# Patient Record
Sex: FEMALE | Race: WHITE | NOT HISPANIC OR LATINO | Employment: UNEMPLOYED | URBAN - METROPOLITAN AREA
[De-identification: names, ages, dates, MRNs, and addresses within clinical notes are randomized per-mention and may not be internally consistent; named-entity substitution may affect disease eponyms.]

---

## 2021-01-11 ENCOUNTER — TELEPHONE (OUTPATIENT)
Dept: OBSTETRICS AND GYNECOLOGY | Facility: CLINIC | Age: 41
End: 2021-01-11

## 2021-04-13 ENCOUNTER — OFFICE VISIT (OUTPATIENT)
Dept: OBSTETRICS AND GYNECOLOGY | Facility: CLINIC | Age: 41
End: 2021-04-13
Payer: COMMERCIAL

## 2021-04-13 VITALS
WEIGHT: 143 LBS | HEIGHT: 67 IN | DIASTOLIC BLOOD PRESSURE: 84 MMHG | BODY MASS INDEX: 22.44 KG/M2 | HEART RATE: 70 BPM | SYSTOLIC BLOOD PRESSURE: 124 MMHG

## 2021-04-13 DIAGNOSIS — R19.00 PELVIC FULLNESS: ICD-10-CM

## 2021-04-13 DIAGNOSIS — Z01.419 WELL WOMAN EXAM WITH ROUTINE GYNECOLOGICAL EXAM: Primary | ICD-10-CM

## 2021-04-13 DIAGNOSIS — Z12.31 BREAST CANCER SCREENING BY MAMMOGRAM: ICD-10-CM

## 2021-04-13 DIAGNOSIS — Z80.41 FAMILY HISTORY OF OVARIAN CANCER: ICD-10-CM

## 2021-04-13 PROCEDURE — 99396 PREV VISIT EST AGE 40-64: CPT | Mod: S$GLB,,, | Performed by: OBSTETRICS & GYNECOLOGY

## 2021-04-13 PROCEDURE — 3008F PR BODY MASS INDEX (BMI) DOCUMENTED: ICD-10-PCS | Mod: CPTII,S$GLB,, | Performed by: OBSTETRICS & GYNECOLOGY

## 2021-04-13 PROCEDURE — 99396 PR PREVENTIVE VISIT,EST,40-64: ICD-10-PCS | Mod: S$GLB,,, | Performed by: OBSTETRICS & GYNECOLOGY

## 2021-04-13 PROCEDURE — 3008F BODY MASS INDEX DOCD: CPT | Mod: CPTII,S$GLB,, | Performed by: OBSTETRICS & GYNECOLOGY

## 2021-04-13 RX ORDER — ESCITALOPRAM OXALATE 10 MG/1
TABLET ORAL
COMMUNITY
Start: 2021-04-01

## 2021-04-13 RX ORDER — CLONAZEPAM 0.5 MG/1
TABLET ORAL
COMMUNITY
Start: 2021-04-01

## 2022-04-19 ENCOUNTER — OFFICE VISIT (OUTPATIENT)
Dept: OBSTETRICS AND GYNECOLOGY | Facility: CLINIC | Age: 42
End: 2022-04-19
Payer: COMMERCIAL

## 2022-04-19 VITALS
HEART RATE: 61 BPM | DIASTOLIC BLOOD PRESSURE: 72 MMHG | SYSTOLIC BLOOD PRESSURE: 116 MMHG | WEIGHT: 157 LBS | BODY MASS INDEX: 24.59 KG/M2

## 2022-04-19 DIAGNOSIS — Z01.419 WELL WOMAN EXAM WITH ROUTINE GYNECOLOGICAL EXAM: Primary | ICD-10-CM

## 2022-04-19 DIAGNOSIS — Z80.41 FAMILY HISTORY OF OVARIAN CANCER: ICD-10-CM

## 2022-04-19 DIAGNOSIS — M54.50 BACK PAIN AT L4-L5 LEVEL: ICD-10-CM

## 2022-04-19 PROBLEM — M54.40 CHRONIC LOW BACK PAIN WITH SCIATICA: Status: ACTIVE | Noted: 2022-04-19

## 2022-04-19 PROBLEM — G89.29 CHRONIC LOW BACK PAIN WITH SCIATICA: Status: ACTIVE | Noted: 2022-04-19

## 2022-04-19 LAB
ABS NRBC COUNT: 0 X 10 3/UL (ref 0–0.01)
ABSOLUTE BASOPHIL: 0.03 X 10 3/UL (ref 0–0.22)
ABSOLUTE EOSINOPHIL: 0.05 X 10 3/UL (ref 0.04–0.54)
ABSOLUTE IMMATURE GRAN: 0.03 X 10 3/UL (ref 0–0.04)
ABSOLUTE LYMPHOCYTE: 1.45 X 10 3/UL (ref 0.86–4.75)
ABSOLUTE MONOCYTE: 0.52 X 10 3/UL (ref 0.22–1.08)
ALBUMIN SERPL-MCNC: 4.5 G/DL (ref 3.5–5.2)
ALBUMIN/GLOB SERPL ELPH: 2.5 {RATIO} (ref 1–2.7)
ALP ISOS SERPL LEV INH-CCNC: 69 U/L (ref 35–105)
ALT (SGPT): 19 U/L (ref 0–33)
AMORPH URATE CRY URNS QL MICRO: NEGATIVE
ANION GAP SERPL CALC-SCNC: 10 MMOL/L (ref 8–17)
AST SERPL-CCNC: 23 U/L (ref 0–32)
BACTERIA #/AREA URNS HPF: ABNORMAL /[HPF]
BASOPHILS NFR BLD: 0.6 % (ref 0.2–1.2)
BILIRUB UR QL STRIP: NEGATIVE
BILIRUBIN, TOTAL: 0.31 MG/DL (ref 0–1.2)
BUN/CREAT SERPL: 23.2 (ref 6–20)
CALCIUM SERPL-MCNC: 9.1 MG/DL (ref 8.6–10.2)
CANCER AG125 SERPL-ACNC: 12 U/ML (ref 0–38.1)
CARBON DIOXIDE, CO2: 27 MMOL/L (ref 22–29)
CHLORIDE: 107 MMOL/L (ref 98–107)
CLARITY UR: CLEAR
COLOR UR: YELLOW
CREAT SERPL-MCNC: 0.75 MG/DL (ref 0.5–0.9)
E2: 243 PG/ML
EOSINOPHIL NFR BLD: 0.9 % (ref 0.7–7)
EPITHELIAL CELLS: ABNORMAL
FSH: 3.95 MIU/ML
GFR ESTIMATION: 83.95
GLOBULIN: 1.8 G/DL (ref 1.5–4.5)
GLUCOSE (UA): NEGATIVE MG/DL
GLUCOSE: 88 MG/DL (ref 74–106)
HCT VFR BLD AUTO: 37.2 % (ref 37–47)
HGB BLD-MCNC: 12.2 G/DL (ref 12–16)
IMMATURE GRANULOCYTES: 0.6 % (ref 0–0.5)
KETONES UR QL STRIP: NEGATIVE MG/DL
LEUKOCYTE ESTERASE UR QL STRIP: NEGATIVE
LH: 6 MIU/ML
LYMPHOCYTES NFR BLD: 27 % (ref 19.3–53.1)
MCH RBC QN AUTO: 31.4 PG (ref 27–32)
MCHC RBC AUTO-ENTMCNC: 32.8 G/DL (ref 32–36)
MCV RBC AUTO: 95.6 FL (ref 82–100)
MONOCYTES NFR BLD: 9.7 % (ref 4.7–12.5)
MUCOUS THREADS URNS QL MICRO: NEGATIVE
NEUTROPHILS # BLD AUTO: 3.3 X 10 3/UL (ref 2.15–7.56)
NEUTROPHILS NFR BLD: 61.2 % (ref 34–71.1)
NITRITE UR QL STRIP: NEGATIVE
NUCLEATED RED BLOOD CELLS: 0 /100 WBC (ref 0–0.2)
OCCULT BLOOD: NEGATIVE
PH, URINE: 5 (ref 5–7.5)
PLATELET # BLD AUTO: 167 X 10 3/UL (ref 135–400)
POTASSIUM: 5.4 MMOL/L (ref 3.5–5.1)
PROT SNV-MCNC: 6.3 G/DL (ref 6.4–8.3)
PROT UR QL STRIP: NEGATIVE MG/DL
RBC # BLD AUTO: 3.89 X 10 6/UL (ref 4.2–5.4)
RBC/HPF: NEGATIVE
RDW-SD: 46.5 FL (ref 37–54)
SODIUM: 144 MMOL/L (ref 136–145)
SP GR UR STRIP: 1.01 (ref 1–1.03)
T4, FREE: 1.03 NG/DL (ref 0.93–1.7)
TSH SERPL DL<=0.005 MIU/L-ACNC: 2.34 UIU/ML (ref 0.27–4.2)
UREA NITROGEN (BUN): 17.4 MG/DL (ref 6–20)
UROBILINOGEN, URINE: NORMAL E.U./DL (ref 0–1)
VITAMIN D (25OHD): 35.2 NG/ML
WBC # BLD: 5.38 X 10 3/UL (ref 4.3–10.8)
WBC/HPF: ABNORMAL

## 2022-04-19 PROCEDURE — 1159F MED LIST DOCD IN RCRD: CPT | Mod: CPTII,S$GLB,, | Performed by: OBSTETRICS & GYNECOLOGY

## 2022-04-19 PROCEDURE — 3074F PR MOST RECENT SYSTOLIC BLOOD PRESSURE < 130 MM HG: ICD-10-PCS | Mod: CPTII,S$GLB,, | Performed by: OBSTETRICS & GYNECOLOGY

## 2022-04-19 PROCEDURE — 3074F SYST BP LT 130 MM HG: CPT | Mod: CPTII,S$GLB,, | Performed by: OBSTETRICS & GYNECOLOGY

## 2022-04-19 PROCEDURE — 99396 PR PREVENTIVE VISIT,EST,40-64: ICD-10-PCS | Mod: S$GLB,,, | Performed by: OBSTETRICS & GYNECOLOGY

## 2022-04-19 PROCEDURE — 3008F PR BODY MASS INDEX (BMI) DOCUMENTED: ICD-10-PCS | Mod: CPTII,S$GLB,, | Performed by: OBSTETRICS & GYNECOLOGY

## 2022-04-19 PROCEDURE — 1159F PR MEDICATION LIST DOCUMENTED IN MEDICAL RECORD: ICD-10-PCS | Mod: CPTII,S$GLB,, | Performed by: OBSTETRICS & GYNECOLOGY

## 2022-04-19 PROCEDURE — 3008F BODY MASS INDEX DOCD: CPT | Mod: CPTII,S$GLB,, | Performed by: OBSTETRICS & GYNECOLOGY

## 2022-04-19 PROCEDURE — 99396 PREV VISIT EST AGE 40-64: CPT | Mod: S$GLB,,, | Performed by: OBSTETRICS & GYNECOLOGY

## 2022-04-19 PROCEDURE — 3078F PR MOST RECENT DIASTOLIC BLOOD PRESSURE < 80 MM HG: ICD-10-PCS | Mod: CPTII,S$GLB,, | Performed by: OBSTETRICS & GYNECOLOGY

## 2022-04-19 PROCEDURE — 3078F DIAST BP <80 MM HG: CPT | Mod: CPTII,S$GLB,, | Performed by: OBSTETRICS & GYNECOLOGY

## 2022-04-19 NOTE — PROGRESS NOTES
Subjective:       Patient ID: Alise Dleeon is a 42 y.o. female.    Chief Complaint:  Well Woman      History of Present Illness  She is doing well.  No new health issues,  No abnormal bleeding, No GI or Gu concerns,  No dyspareunia.   She is having sig back pain  She has seen pain management   drs.   She does have some night sweats.   She is taking clonopin to help her sleep.  She is on lexapro   She has been on this a year.     HPI      GYN & OB History  Patient's last menstrual period was 04/10/2022.     Date of Last Pap: No result found    OB History    Para Term  AB Living   2 2       2   SAB IAB Ectopic Multiple Live Births                  # Outcome Date GA Lbr Brendan/2nd Weight Sex Delivery Anes PTL Lv   2 Para            1 Para                Review of Systems  Review of Systems   Constitutional: Negative for activity change.   Eyes: Negative for visual disturbance.   Respiratory: Negative for shortness of breath.    Cardiovascular: Negative for chest pain.   Gastrointestinal: Negative for abdominal pain.   Genitourinary: Negative for vaginal bleeding.        No abnormal vaginal bleeding   Musculoskeletal: Negative for back pain.   Integumentary:  Negative for rash and breast mass.   Neurological: Negative for numbness.   Psychiatric/Behavioral:        No mood disturbance or changes    Breast: Negative for mass            Objective:    Physical Exam:   Constitutional: She is oriented to person, place, and time. She appears well-developed. She is cooperative.    HENT:   Head: Normocephalic.     Neck: Trachea normal. No thyromegaly present.    Cardiovascular: Normal rate, regular rhythm and normal heart sounds.     Pulmonary/Chest: Effort normal and breath sounds normal. Right breast exhibits no mass, no nipple discharge and no skin change. Left breast exhibits no mass, no nipple discharge and no skin change.        Abdominal: Soft. There is no abdominal tenderness. There is no rebound and no  guarding.     Genitourinary:    Vagina and uterus normal.      Pelvic exam was performed with patient supine.   Labial bartholins normal.There is no lesion on the right labia. There is no lesion on the left labia. Cervix is normal. Right adnexum displays no mass and no tenderness. Left adnexum displays no mass and no tenderness. Cervix exhibits no discharge. Also,  pap results normal   Uterus is not enlarged and not tender.              Lymphadenopathy:        Head (right side): No submental and no submandibular adenopathy present.        Head (left side): No submental and no submandibular adenopathy present.     She has no cervical adenopathy.    Neurological: She is alert and oriented to person, place, and time.    Skin: Skin is warm.    Psychiatric: She has a normal mood and affect. Her speech is normal and behavior is normal. Thought content normal.          Assessment:        1. Well woman exam with routine gynecological exam    2. Back pain at L4-L5 level    3. Family history of ovarian cancer               Plan:         will see lesley feng     Pap   Mammogram  Follow up one year or sooner if needed  Self breast exams  Consider health profile if labs not done with PCP  Recommend colonoscopy   Pt is aware we call all results. If she does not hear from our office regarding her result within a week of having a study or procedure performed she is to call the office so that we can research the result for her as I do not know when she is scheduling her procedures.   Chaperone was present

## 2022-04-22 DIAGNOSIS — R79.89 ABNORMAL CBC: ICD-10-CM

## 2022-04-22 DIAGNOSIS — Z01.419 WELL WOMAN EXAM WITH ROUTINE GYNECOLOGICAL EXAM: Primary | ICD-10-CM

## 2022-04-27 ENCOUNTER — PROCEDURE VISIT (OUTPATIENT)
Dept: OBSTETRICS AND GYNECOLOGY | Facility: CLINIC | Age: 42
End: 2022-04-27
Payer: COMMERCIAL

## 2022-04-27 DIAGNOSIS — M54.50 BACK PAIN AT L4-L5 LEVEL: ICD-10-CM

## 2022-04-27 DIAGNOSIS — Z80.41 FAMILY HISTORY OF OVARIAN CANCER: ICD-10-CM

## 2022-04-27 DIAGNOSIS — Z01.419 WELL WOMAN EXAM WITH ROUTINE GYNECOLOGICAL EXAM: ICD-10-CM

## 2022-04-27 DIAGNOSIS — N83.201 CYST OF RIGHT OVARY: ICD-10-CM

## 2022-04-27 DIAGNOSIS — Z80.41 FAMILY HISTORY OF OVARIAN CANCER: Primary | ICD-10-CM

## 2022-04-27 PROCEDURE — 76830 US OB/GYN PROCEDURE (VIEWPOINT): ICD-10-PCS | Mod: S$GLB,,, | Performed by: OBSTETRICS & GYNECOLOGY

## 2022-04-27 PROCEDURE — 76830 TRANSVAGINAL US NON-OB: CPT | Mod: S$GLB,,, | Performed by: OBSTETRICS & GYNECOLOGY

## 2022-05-09 ENCOUNTER — TELEPHONE (OUTPATIENT)
Dept: OBSTETRICS AND GYNECOLOGY | Facility: CLINIC | Age: 42
End: 2022-05-09
Payer: COMMERCIAL

## 2022-05-09 NOTE — TELEPHONE ENCOUNTER
----- Message from Nabila Shen sent at 5/9/2022 10:19 AM CDT -----   Patient calling to reschedule ultrasound . Call back number [phone] (home)

## 2022-05-09 NOTE — TELEPHONE ENCOUNTER
Patient states she will be out of town and needs to reschedule her appointment. Patient has been rescheduled.

## 2022-06-01 ENCOUNTER — PROCEDURE VISIT (OUTPATIENT)
Dept: OBSTETRICS AND GYNECOLOGY | Facility: CLINIC | Age: 42
End: 2022-06-01
Payer: COMMERCIAL

## 2022-06-01 DIAGNOSIS — Z80.41 FAMILY HISTORY OF OVARIAN CANCER: ICD-10-CM

## 2022-06-01 DIAGNOSIS — N83.201 CYST OF RIGHT OVARY: ICD-10-CM

## 2022-06-01 PROCEDURE — 76830 US OB/GYN PROCEDURE (VIEWPOINT): ICD-10-PCS | Mod: S$GLB,,, | Performed by: OBSTETRICS & GYNECOLOGY

## 2022-06-01 PROCEDURE — 76830 TRANSVAGINAL US NON-OB: CPT | Mod: S$GLB,,, | Performed by: OBSTETRICS & GYNECOLOGY

## 2022-06-01 NOTE — Clinical Note
Please call the cyst resolved on the rt ovary the left has a small 1.7 cm simple cyst   everything is ok

## 2022-06-15 ENCOUNTER — PATIENT MESSAGE (OUTPATIENT)
Dept: OBSTETRICS AND GYNECOLOGY | Facility: CLINIC | Age: 42
End: 2022-06-15
Payer: COMMERCIAL

## 2022-06-15 NOTE — PROGRESS NOTES
6/16/22  3:53pm attempted to contact patient, no answer, left voicemail. Sent message via patient portal. Will attempt to reach patient again at a later time.

## 2022-07-07 ENCOUNTER — PATIENT MESSAGE (OUTPATIENT)
Dept: OBSTETRICS AND GYNECOLOGY | Facility: CLINIC | Age: 42
End: 2022-07-07
Payer: COMMERCIAL

## 2022-10-13 DIAGNOSIS — R79.89 LOW TESTOSTERONE: Primary | ICD-10-CM

## 2022-10-13 NOTE — TELEPHONE ENCOUNTER
----- Message from Toma Saavedra sent at 10/13/2022  2:58 PM CDT -----  Contact: self  Pt wanted to talk to the nurse about her hormone pills please call back at [phone]

## 2022-10-13 NOTE — TELEPHONE ENCOUNTER
Patient had labs done with her internist and her testosterone came back low. She c/o fatige, joint pain, weight gain in abdomin. She is interested in starting on something to increase her testosterone and wants to know what Dr. Dawson recommends. Her mom  from cervical cancer, so she would like to go with a safe option with her family history. Advised pt that Dr. Dawson is out of the office but we will check with him and contact her next week.

## 2022-10-20 RX ORDER — TESTOSTERONE
POWDER (GRAM) MISCELLANEOUS
Qty: 30 G | Refills: 4 | Status: SHIPPED | OUTPATIENT
Start: 2022-10-20

## 2022-10-20 NOTE — TELEPHONE ENCOUNTER
Dr. Dawson recommended testosterone cream from [facility]. Called and spoke to patient about prescription. Patient would like to recheck testosterone at a later date to monitor. Sent order to path lab.

## 2023-02-18 PROBLEM — Z80.3 FAMILY HISTORY OF MALIGNANT NEOPLASM OF BREAST: Status: ACTIVE | Noted: 2023-02-18

## 2023-02-18 PROBLEM — Z80.41 FAMILY HISTORY OF MALIGNANT NEOPLASM OF OVARY: Status: ACTIVE | Noted: 2023-02-18

## 2023-02-18 PROBLEM — Z87.2 HISTORY OF ABSCESS OF BREAST: Status: ACTIVE | Noted: 2023-02-18

## 2023-02-18 NOTE — PROGRESS NOTES
Breast Surgery Clinic Note    Chief Complaint:   Alise Deleon is a 43 y.o. female presenting today as part of our high-risk clinic. It is time for her annual mammogram. She will reports no worries findings on her self examination.    History of Present Illness:   Mrs. Deleon first presented on November 14, 2016 to establish ongoing breast care. She has a worrisome family history. Her HILLARY life time risk of breast cancer is 19.5% (2018). MyRisk Negative (2018). MD::: Blayne Purvis MD; Shan Dawson MD.    Past Medical History:   Diagnosis Date    Acne     At high risk for breast cancer     Excessive menstruation     Family history of malignant neoplasm of breast 2/18/2023    Family history of malignant neoplasm of ovary 2/18/2023    Family history of ovarian cancer     Fissure, anal     Hair loss     History of chickenpox     Ovary palpable       Past Surgical History:   Procedure Laterality Date    BREAST CYST ASPIRATION Left     LAPAROSCOPY WITH LYSIS OF ADHESIONS      LEFT CYSTECTOMY      TONSILLECTOMY      WISDOM TOOTH EXTRACTION          Current Outpatient Medications:     clonazePAM (KLONOPIN) 0.5 MG tablet, TAKE 1/2 1 TABLET BY MOUTH TWICE DAILY AND 1 2 TABLETS AT BEDTIME, Disp: , Rfl:     EScitalopram oxalate (LEXAPRO) 10 MG tablet, TAKE 1 TABLET BY MOUTH EVERY DAY AT BEDTIME AS DIRECTED, Disp: , Rfl:     ibuprofen/famotidine (DUEXIS ORAL), Take by mouth., Disp: , Rfl:     testosterone, bulk, Powd, COMPOUNDED TESTOSTERONE CREAM 3MG/ML APPLY 1ML TOPICALLY DAILY AS DIRECTED #30GM WITH 6RF, Disp: 30 g, Rfl: 4   Review of patient's allergies indicates:  No Known Allergies   Social History     Tobacco Use    Smoking status: Never    Smokeless tobacco: Never   Substance Use Topics    Alcohol use: Yes     Comment: OCCASIONAL       Family History   Problem Relation Age of Onset    Hypertension Father     Heart disease Father     Hypertension Mother     No Known Problems Sister     No Known Problems Brother      Breast cancer Maternal Grandmother     No Known Problems Maternal Grandfather     No Known Problems Paternal Grandmother     No Known Problems Paternal Grandfather     Breast cancer Other         aunt      Review of Systems   Genitourinary:  Negative for hot flashes.   Integumentary:  Negative for color change, rash, mole/lesion, breast mass, breast discharge and breast tenderness.   Breast: Negative for mass and tenderness   Physical Exam  Chest:      Chest wall: No mass.   Breasts:     Right: Normal. No swelling, mass, nipple discharge or skin change.      Left: Normal. No swelling, mass, nipple discharge or skin change.   Lymphadenopathy:      Upper Body:      Right upper body: No axillary adenopathy.      Left upper body: No axillary adenopathy.     MAMMOGRAM REPORT: She has some diffuse fibronodular tissue bilaterally; there are no spiculated lesions, distortions or suspicious calcifications noted; NEM       1. Family history of malignant neoplasm of breast    We discussed her family history and how it could impact her own future risks.  We discussed family vs. genetic history and the importance of each.  Genetic Counseling/Testing was offered, and all questions answered.     2. Family history of malignant neoplasm of ovary    She tested Gene Negative in 2018.  We discussed all implications.    3. History of abscess of  left breast    She has had no further abscess issues.    4. Screening breast examination     She understands that her imaging and exams have remained stable and is comfortable being followed in a conservative fashion. She understands the importance of monthly self-breast examination and knows to report any and all changes as they occur.     Her films are stable based on my review.  As this was done as a screening exam, her films will be reviewed by the Radiologist and compared to her previous films.  Her report will usually be received within 24 hours.  Once received and reviewed - I will phone  her with any additional recommendations as needed.      Medical Decision Making:    Follow up:  6 months and prn (PE, MRI vs US)

## 2023-03-01 ENCOUNTER — OFFICE VISIT (OUTPATIENT)
Dept: SURGERY | Facility: CLINIC | Age: 43
End: 2023-03-01
Payer: COMMERCIAL

## 2023-03-01 DIAGNOSIS — Z12.39 SCREENING BREAST EXAMINATION: ICD-10-CM

## 2023-03-01 DIAGNOSIS — Z80.41 FAMILY HISTORY OF MALIGNANT NEOPLASM OF OVARY: ICD-10-CM

## 2023-03-01 DIAGNOSIS — Z87.2 HISTORY OF ABSCESS OF BREAST: ICD-10-CM

## 2023-03-01 DIAGNOSIS — Z80.3 FAMILY HISTORY OF MALIGNANT NEOPLASM OF BREAST: Primary | ICD-10-CM

## 2023-03-01 PROCEDURE — 1159F MED LIST DOCD IN RCRD: CPT | Mod: CPTII,S$GLB,, | Performed by: SPECIALIST

## 2023-03-01 PROCEDURE — 99213 PR OFFICE/OUTPT VISIT, EST, LEVL III, 20-29 MIN: ICD-10-PCS | Mod: S$GLB,,, | Performed by: SPECIALIST

## 2023-03-01 PROCEDURE — 99213 OFFICE O/P EST LOW 20 MIN: CPT | Mod: S$GLB,,, | Performed by: SPECIALIST

## 2023-03-01 PROCEDURE — 1159F PR MEDICATION LIST DOCUMENTED IN MEDICAL RECORD: ICD-10-PCS | Mod: CPTII,S$GLB,, | Performed by: SPECIALIST

## 2023-03-01 PROCEDURE — 1160F RVW MEDS BY RX/DR IN RCRD: CPT | Mod: CPTII,S$GLB,, | Performed by: SPECIALIST

## 2023-03-01 PROCEDURE — 1160F PR REVIEW ALL MEDS BY PRESCRIBER/CLIN PHARMACIST DOCUMENTED: ICD-10-PCS | Mod: CPTII,S$GLB,, | Performed by: SPECIALIST

## 2023-04-20 ENCOUNTER — OFFICE VISIT (OUTPATIENT)
Dept: OBSTETRICS AND GYNECOLOGY | Facility: CLINIC | Age: 43
End: 2023-04-20
Payer: COMMERCIAL

## 2023-04-20 VITALS
BODY MASS INDEX: 25.11 KG/M2 | HEART RATE: 76 BPM | HEIGHT: 67 IN | WEIGHT: 160 LBS | DIASTOLIC BLOOD PRESSURE: 70 MMHG | SYSTOLIC BLOOD PRESSURE: 104 MMHG

## 2023-04-20 DIAGNOSIS — Z01.419 WELL WOMAN EXAM WITH ROUTINE GYNECOLOGICAL EXAM: Primary | ICD-10-CM

## 2023-04-20 DIAGNOSIS — Z12.31 BREAST CANCER SCREENING BY MAMMOGRAM: ICD-10-CM

## 2023-04-20 PROCEDURE — 3074F SYST BP LT 130 MM HG: CPT | Mod: CPTII,S$GLB,, | Performed by: OBSTETRICS & GYNECOLOGY

## 2023-04-20 PROCEDURE — 3074F PR MOST RECENT SYSTOLIC BLOOD PRESSURE < 130 MM HG: ICD-10-PCS | Mod: CPTII,S$GLB,, | Performed by: OBSTETRICS & GYNECOLOGY

## 2023-04-20 PROCEDURE — 3078F PR MOST RECENT DIASTOLIC BLOOD PRESSURE < 80 MM HG: ICD-10-PCS | Mod: CPTII,S$GLB,, | Performed by: OBSTETRICS & GYNECOLOGY

## 2023-04-20 PROCEDURE — 3008F PR BODY MASS INDEX (BMI) DOCUMENTED: ICD-10-PCS | Mod: CPTII,S$GLB,, | Performed by: OBSTETRICS & GYNECOLOGY

## 2023-04-20 PROCEDURE — 3078F DIAST BP <80 MM HG: CPT | Mod: CPTII,S$GLB,, | Performed by: OBSTETRICS & GYNECOLOGY

## 2023-04-20 PROCEDURE — 99396 PR PREVENTIVE VISIT,EST,40-64: ICD-10-PCS | Mod: S$GLB,,, | Performed by: OBSTETRICS & GYNECOLOGY

## 2023-04-20 PROCEDURE — 3008F BODY MASS INDEX DOCD: CPT | Mod: CPTII,S$GLB,, | Performed by: OBSTETRICS & GYNECOLOGY

## 2023-04-20 PROCEDURE — 99396 PREV VISIT EST AGE 40-64: CPT | Mod: S$GLB,,, | Performed by: OBSTETRICS & GYNECOLOGY

## 2023-04-20 NOTE — PROGRESS NOTES
Subjective:       Patient ID: Alise Deleon is a 43 y.o. female.    Chief Complaint:  Well Woman      History of Present Illness  She is doing well.  No new health issues,  No abnormal bleeding, No GI or Gu concerns,  No dyspareunia.   Bilateral salpingectomy   her cycles are 27 days  but last one-two days    she is on a testosterone support     HPI      GYN & OB History  Patient's last menstrual period was 2023 (exact date).     Date of Last Pap: No result found    OB History    Para Term  AB Living   2 2       2   SAB IAB Ectopic Multiple Live Births                  # Outcome Date GA Lbr Brendan/2nd Weight Sex Delivery Anes PTL Lv   2 Para            1 Para                Review of Systems  Review of Systems   Constitutional:  Negative for activity change.   Eyes:  Negative for visual disturbance.   Respiratory:  Negative for shortness of breath.    Cardiovascular:  Negative for chest pain.   Gastrointestinal:  Negative for abdominal pain.   Genitourinary:  Negative for vaginal bleeding.        No abnormal vaginal bleeding   Musculoskeletal:  Negative for back pain.   Integumentary:  Negative for rash and breast mass.   Neurological:  Negative for numbness.   Psychiatric/Behavioral:          No mood disturbance or changes    Breast: Negative for mass          Objective:    Physical Exam:   Constitutional: She is oriented to person, place, and time. She appears well-developed. She is cooperative.    HENT:   Head: Normocephalic.     Neck: Trachea normal. No thyromegaly present.    Cardiovascular:  Normal rate, regular rhythm and normal heart sounds.             Pulmonary/Chest: Effort normal and breath sounds normal. Right breast exhibits no mass, no nipple discharge and no skin change. Left breast exhibits no mass, no nipple discharge and no skin change.        Abdominal: Soft. There is no abdominal tenderness. There is no rebound and no guarding.     Genitourinary:    Vagina and uterus normal.       Pelvic exam was performed with patient supine.   Labial bartholins normal.There is no lesion on the right labia. There is no lesion on the left labia. Cervix is normal. Right adnexum displays no mass and no tenderness. Left adnexum displays no mass and no tenderness. Cervix exhibits no discharge. Uterus is not enlarged and not tender.              Lymphadenopathy:        Head (right side): No submental and no submandibular adenopathy present.        Head (left side): No submental and no submandibular adenopathy present.     She has no cervical adenopathy.    Neurological: She is alert and oriented to person, place, and time.    Skin: Skin is warm.    Psychiatric: She has a normal mood and affect. Her speech is normal and behavior is normal. Thought content normal.        Assessment:        1. Well woman exam with routine gynecological exam    2. Breast cancer screening by mammogram               Plan:           Will try a lo loestin  to help with her perimenopausal symptoms  Pap   Mammogram  Follow up one year or sooner if needed  Self breast exams  Consider health profile if labs not done with PCP  Recommend colonoscopy   Pt is aware we call all results. If she does not hear from our office regarding her result within a week of having a study or procedure performed she is to call the office so that we can research the result for her as I do not know when she is scheduling her procedures.   Chaperone was present

## 2023-04-24 LAB — Lab: NORMAL

## 2023-07-03 ENCOUNTER — PATIENT MESSAGE (OUTPATIENT)
Dept: OBSTETRICS AND GYNECOLOGY | Facility: CLINIC | Age: 43
End: 2023-07-03
Payer: COMMERCIAL

## 2023-07-03 DIAGNOSIS — Z80.41 FAMILY HISTORY OF OVARIAN CANCER: Primary | ICD-10-CM

## 2023-07-28 ENCOUNTER — PATIENT MESSAGE (OUTPATIENT)
Dept: OBSTETRICS AND GYNECOLOGY | Facility: CLINIC | Age: 43
End: 2023-07-28
Payer: COMMERCIAL

## 2023-08-22 NOTE — ASSESSMENT & PLAN NOTE
We discussed her family history and how it could impact her own future risks.  We discussed family vs. genetic history and the importance and implications of each.  Genetic Counseling/Testing was offered, and all questions answered to her satisfaction.  She knows that as additional family members are diagnosed - she will need to let us know as this may change follow up and imaging recommendations.    We had a discussion concerning Breast Cancer Risk Reduction and current NCCN Guidelines. She knows that her risk can be lowered slightly with a healthy lifestyle and minimal ETOH use. Being physically active will also help. She should reduce or stay away from OCPs and HRT as possible.     We reviewed our findings today and her questions were answered.  She understands that her imaging and exams have remained stable (and show nothing concerning).  She is comfortable being followed in a conservative fashion.      She understands the importance of monthly self-breast examination and knows to report any and all changes as they occur.

## 2023-08-22 NOTE — PROGRESS NOTES
[facility]  Shadi Johnston MD, FACS  Sidra Love NP-MAT      Date of Service: 09/05/2023    Chief Complaint:   Alise Deleon is a 43 y.o. female presenting today for her 6-month evaluation. She is due for an ultrasound.  She reports no interval changes.     History of Present Illness:   Mrs. Deleon first presented on November 14, 2016 to establish ongoing breast care. She has a worrisome family history. Her HILLARY life time risk of breast cancer is 19.5% (2018). MyRisk Negative (2018). MD::: Blanye Purvis MD; Shan Dawson MD    Past Medical History:   Diagnosis Date    Acne     At high risk for breast cancer     Excessive menstruation     Family history of malignant neoplasm of breast 2/18/2023    Family history of malignant neoplasm of ovary 2/18/2023    Family history of ovarian cancer     Fissure, anal     Hair loss     History of chickenpox     Ovary palpable       Past Surgical History:   Procedure Laterality Date    BREAST CYST ASPIRATION Left     LAPAROSCOPY WITH LYSIS OF ADHESIONS      LEFT CYSTECTOMY      TONSILLECTOMY      WISDOM TOOTH EXTRACTION          Current Outpatient Medications:     clonazePAM (KLONOPIN) 0.5 MG tablet, TAKE 1/2 1 TABLET BY MOUTH TWICE DAILY AND 1 2 TABLETS AT BEDTIME, Disp: , Rfl:     EScitalopram oxalate (LEXAPRO) 10 MG tablet, TAKE 1 TABLET BY MOUTH EVERY DAY AT BEDTIME AS DIRECTED, Disp: , Rfl:     ibuprofen/famotidine (DUEXIS ORAL), Take by mouth., Disp: , Rfl:     testosterone, bulk, Powd, COMPOUNDED TESTOSTERONE CREAM 3MG/ML APPLY 1ML TOPICALLY DAILY AS DIRECTED #30GM WITH 6RF, Disp: 30 g, Rfl: 4   Review of patient's allergies indicates:  No Known Allergies   Social History     Tobacco Use    Smoking status: Never    Smokeless tobacco: Never   Substance Use Topics    Alcohol use: Yes     Alcohol/week: 1.0 standard drink of alcohol     Types: 1 Glasses of wine per week     Comment: OCCASIONAL       Family History   Problem  Relation Age of Onset    Hypertension Father     Heart disease Father     Hypertension Mother     Ovarian cancer Mother     No Known Problems Sister     No Known Problems Brother     Breast cancer Maternal Grandmother     No Known Problems Maternal Grandfather     No Known Problems Paternal Grandmother     No Known Problems Paternal Grandfather     Breast cancer Other         aunt        Review of Systems   Integumentary:  Negative for color change, rash, mole/lesion, breast mass, breast discharge and breast tenderness.   Breast: Negative for mass and tenderness       Physical Exam   HENT:   Head: Normocephalic.   Pulmonary/Chest: Right breast exhibits no inverted nipple, no mass, no nipple discharge, no skin change and no tenderness. Left breast exhibits no inverted nipple, no mass, no nipple discharge, no skin change and no tenderness. No breast swelling.   Genitourinary: No breast swelling.   Musculoskeletal: Lymphadenopathy:      Upper Body:      Right upper body: No supraclavicular or axillary adenopathy.      Left upper body: No supraclavicular or axillary adenopathy.     Neurological: She is alert.        ULTRASOUND EVALUATION and REPORT    Bilateral real-time Ultrasound was performed by me.  All four quadrants of the breast, the subareolar and axillary basins were scanned.    She has some heterogeneous dense fibroglandular tissue bilaterally.    Right Breast: She has normal tissues in the right breast; there's no disruption of the tissue planes and no abnormal shadowing; she has normal skin thickness with no subcutaneous nodules of skin thickening; NEM     Left Breast: She has normal tissues in the left breast; there's no disruption of the tissue planes and no abnormal shadowing; she has normal skin thickness with no subcutaneous nodules or skin thickening; NEM     Axillae: There are no abnormal lymph nodes seen bilaterally.     Impression: Some dense but normal tissue bilaterally with no solid/suspicious  masses noted. No LAD in bilateral axillae.  BIRADS: Category 2 - Benign Finding.    Findings were discussed with patient in real time and a hand written report was given to her at the conclusion of the exam.      ASSESSMENT and PLAN OF CARE     1. Family history of malignant neoplasm of breast  Assessment & Plan:  We discussed her family history and how it could impact her own future risks.  We discussed family vs. genetic history and the importance and implications of each.  Genetic Counseling/Testing was offered, and all questions answered to her satisfaction.  She knows that as additional family members are diagnosed - she will need to let us know as this may change follow up and imaging recommendations.    We had a discussion concerning Breast Cancer Risk Reduction and current NCCN Guidelines. She knows that her risk can be lowered slightly with a healthy lifestyle and minimal ETOH use. Being physically active will also help. She should reduce or stay away from OCPs and HRT as possible.     We reviewed our findings today and her questions were answered.  She understands that her imaging and exams have remained stable (and show nothing concerning).  She is comfortable being followed in a conservative fashion.      She understands the importance of monthly self-breast examination and knows to report any and all changes as they occur.        2. Family history of malignant neoplasm of ovary  Assessment & Plan:  Same as above.      Medical Decision Making: It is my impression that the patient suffers from all the listed conditions.  Each of these conditions did affect my Plan of Care and all medical decisions that were rendered.  The medical decision making was of high difficulty based on her co-morbidities and her previous diagnosis of being a High-Risk Patient given her personal and family histories.   I have performed and reviewed all imaging and it has been discussed with her. I have reviewed and verified her  allergies, list of medications, medical and surgical histories, social history, and a pertinent review of symptoms.    Follow up: 6 months and prn     For:  Physical Examination and MGM (S) at

## 2023-09-05 ENCOUNTER — OFFICE VISIT (OUTPATIENT)
Dept: SURGERY | Facility: CLINIC | Age: 43
End: 2023-09-05
Payer: COMMERCIAL

## 2023-09-05 DIAGNOSIS — Z80.3 FAMILY HISTORY OF MALIGNANT NEOPLASM OF BREAST: Primary | ICD-10-CM

## 2023-09-05 DIAGNOSIS — Z80.41 FAMILY HISTORY OF MALIGNANT NEOPLASM OF OVARY: ICD-10-CM

## 2023-09-05 PROCEDURE — 99214 OFFICE O/P EST MOD 30 MIN: CPT | Mod: S$GLB,,, | Performed by: SPECIALIST

## 2023-09-05 PROCEDURE — 1159F PR MEDICATION LIST DOCUMENTED IN MEDICAL RECORD: ICD-10-PCS | Mod: CPTII,S$GLB,, | Performed by: SPECIALIST

## 2023-09-05 PROCEDURE — 1159F MED LIST DOCD IN RCRD: CPT | Mod: CPTII,S$GLB,, | Performed by: SPECIALIST

## 2023-09-05 PROCEDURE — 1160F RVW MEDS BY RX/DR IN RCRD: CPT | Mod: CPTII,S$GLB,, | Performed by: SPECIALIST

## 2023-09-05 PROCEDURE — 99214 PR OFFICE/OUTPT VISIT, EST, LEVL IV, 30-39 MIN: ICD-10-PCS | Mod: S$GLB,,, | Performed by: SPECIALIST

## 2023-09-05 PROCEDURE — 1160F PR REVIEW ALL MEDS BY PRESCRIBER/CLIN PHARMACIST DOCUMENTED: ICD-10-PCS | Mod: CPTII,S$GLB,, | Performed by: SPECIALIST

## 2023-09-18 ENCOUNTER — TELEPHONE (OUTPATIENT)
Dept: OBSTETRICS AND GYNECOLOGY | Facility: CLINIC | Age: 43
End: 2023-09-18
Payer: COMMERCIAL

## 2023-09-18 ENCOUNTER — PATIENT MESSAGE (OUTPATIENT)
Dept: OBSTETRICS AND GYNECOLOGY | Facility: CLINIC | Age: 43
End: 2023-09-18
Payer: COMMERCIAL

## 2023-09-18 NOTE — TELEPHONE ENCOUNTER
Sent pt message through portal advising her of her wellness that is already scheduled with Dr. Dawson for next year.

## 2023-09-18 NOTE — TELEPHONE ENCOUNTER
----- Message from Karina Carvajal sent at 9/18/2023 11:21 AM CDT -----  Type:  Sooner Apoointment Request    Caller is requesting a sooner appointment.  Caller declined first available appointment listed below.  Caller will not accept being placed on the waitlist and is requesting a message be sent to doctor.  Name of Caller: Patient  When is the first available appointment? N/a  Symptoms: annual  Would the patient rather a call back or a response via [facility]? Call back  Best Call Back Number: [phone]   Additional Information:

## 2024-03-07 DIAGNOSIS — Z12.39 SCREENING BREAST EXAMINATION: Primary | ICD-10-CM

## 2024-03-07 NOTE — PROGRESS NOTES
Date of Service: 3/27/2024    Chief Complaint:   Alise Deleon is a 44 y.o. female presenting today for her 6 month evaluation. She is due for her annual mammogram.  She reports no interval changes.     History of Present Illness:   Mrs. Deleon first presented on November 14, 2016 to establish ongoing breast care. She has a worrisome family history. Her HILLARY life time risk of breast cancer is 19.5% (2018). MyRisk Negative (2018). MD::: Blayne Purvis MD; Shan Dawson MD    Past Medical History:   Diagnosis Date    Acne     At high risk for breast cancer     Excessive menstruation     Family history of malignant neoplasm of breast 02/18/2023    Family history of malignant neoplasm of ovary 02/18/2023    Family history of ovarian cancer     Fissure, anal     Hair loss     History of chickenpox     Ovary palpable     Screening mammogram for high-risk patient 03/09/2024      Past Surgical History:   Procedure Laterality Date    BREAST CYST ASPIRATION Left     LAPAROSCOPY WITH LYSIS OF ADHESIONS      LEFT CYSTECTOMY      TONSILLECTOMY      WISDOM TOOTH EXTRACTION          Current Outpatient Medications:     clonazePAM (KLONOPIN) 0.5 MG tablet, TAKE 1/2 1 TABLET BY MOUTH TWICE DAILY AND 1 2 TABLETS AT BEDTIME, Disp: , Rfl:     EScitalopram oxalate (LEXAPRO) 10 MG tablet, TAKE 1 TABLET BY MOUTH EVERY DAY AT BEDTIME AS DIRECTED, Disp: , Rfl:     ibuprofen/famotidine (DUEXIS ORAL), Take by mouth., Disp: , Rfl:     testosterone, bulk, Powd, COMPOUNDED TESTOSTERONE CREAM 3MG/ML APPLY 1ML TOPICALLY DAILY AS DIRECTED #30GM WITH 6RF, Disp: 30 g, Rfl: 4   Review of patient's allergies indicates:  No Known Allergies   Social History     Tobacco Use    Smoking status: Never    Smokeless tobacco: Never   Substance Use Topics    Alcohol use: Yes     Alcohol/week: 1.0 standard drink of alcohol     Types: 1 Glasses of wine per week     Comment: OCCASIONAL       Family History   Problem Relation Age of Onset    Hypertension  Father     Heart disease Father     Hypertension Mother     Ovarian cancer Mother     No Known Problems Sister     No Known Problems Brother     Breast cancer Maternal Grandmother     No Known Problems Maternal Grandfather     No Known Problems Paternal Grandmother     No Known Problems Paternal Grandfather     Breast cancer Other         aunt        Review of Systems   Integumentary:  Negative for color change, rash, mole/lesion, thickening/swelling, dimpling of skin, drainage  Breast: Negative for mass and tenderness     Physical Exam   Constitutional: She appears well-developed. She is cooperative.   HENT: Normocephalic.   Cardiovascular:  Normal rate and regular rhythm.            Pulmonary/Chest: She exhibits no tenderness and no bony tenderness.   Abdominal: Soft. Normal appearance.   Musculoskeletal: Intact with no deficits  Neurological: She is alert.   Skin: No rash noted.   Breast and Chest Wall Evaluation:   Right breast exhibits no mass, no nipple discharge, no skin change and no tenderness.     Left breast exhibits no mass, no nipple discharge, no skin change and no tenderness.     Lymphadenopathy: No supraclavicular or axillary adenopathy.    MAMMOGRAM REPORT: She has some diffuse fibronodular tissue; there are no spiculated lesions, distortions or suspicious calcifications noted; NEM    ASSESSMENT and PLAN OF CARE     1. Screening mammogram for high-risk patient  Assessment & Plan:  Her films are stable based on my review.  As this was done as a screening exam, her films will be reviewed by the Radiologist and compared to her previous films.  Her report will usually be received within 24 hours.  Once received and reviewed - I will phone her with any additional recommendations as needed.    NOTE:::We viewed her films together at today's visit.  We discussed the multiple views obtained and the important findings.  Even benign changes were mentioned and her questions were answered.  She knows that she  may receive a formal letter or report from the Radiologist.  She is to contact us if she has questions.        2. Family history of malignant neoplasm of breast  Assessment & Plan:  We discussed her family history and how it could impact her own future risks.  We discussed family vs. genetic history and the importance and implications of each.  Genetic Counseling/Testing was offered, and all questions answered to her satisfaction.  She knows that as additional family members are diagnosed - she will need to let us know as this may change follow up and imaging recommendations.    We had a discussion concerning Breast Cancer Risk Reduction and current NCCN Guidelines. She knows that her risk can be lowered slightly with a healthy lifestyle and minimal ETOH use. Being physically active will also help. She should reduce or stay away from OCPs and HRT as possible.         Medical Decision Making: It is my impression that this patient suffers all conditions contained in this medical document.  Each of these conditions did affect our plan of care and my medical decision making today.  It is my opinion that the medical decision making concerning this patient was of minimal  difficulty based on the aforementioned conditions.  Any further recommendations will be communicated to the patient by me.  I have reviewed and verified her allergies, list of medications, medical and surgical histories, social history, and a pertinent review of symptoms.      Follow up:  6 months and prn    For:  Physical Examination and US with me vs MRI

## 2024-03-09 PROBLEM — Z12.31 SCREENING MAMMOGRAM FOR HIGH-RISK PATIENT: Status: ACTIVE | Noted: 2024-03-09

## 2024-03-09 NOTE — ASSESSMENT & PLAN NOTE
Her films are stable based on my review.  As this was done as a screening exam, her films will be reviewed by the Radiologist and compared to her previous films.  Her report will usually be received within 24 hours.  Once received and reviewed - I will phone her with any additional recommendations as needed.    NOTE:::We viewed her films together at today's visit.  We discussed the multiple views obtained and the important findings.  Even benign changes were mentioned and her questions were answered.  She knows that she may receive a formal letter or report from the Radiologist.  She is to contact us if she has questions.

## 2024-03-27 ENCOUNTER — OFFICE VISIT (OUTPATIENT)
Dept: SURGERY | Facility: CLINIC | Age: 44
End: 2024-03-27
Payer: COMMERCIAL

## 2024-03-27 DIAGNOSIS — Z80.3 FAMILY HISTORY OF MALIGNANT NEOPLASM OF BREAST: ICD-10-CM

## 2024-03-27 DIAGNOSIS — Z12.31 SCREENING MAMMOGRAM FOR HIGH-RISK PATIENT: Primary | ICD-10-CM

## 2024-03-27 PROCEDURE — 99213 OFFICE O/P EST LOW 20 MIN: CPT | Mod: S$GLB,,, | Performed by: SPECIALIST

## 2024-03-27 PROCEDURE — 99999 PR PBB SHADOW E&M-EST. PATIENT-LVL II: CPT | Mod: PBBFAC,,, | Performed by: SPECIALIST

## 2024-03-27 PROCEDURE — 1159F MED LIST DOCD IN RCRD: CPT | Mod: CPTII,S$GLB,, | Performed by: SPECIALIST

## 2024-03-27 PROCEDURE — 1160F RVW MEDS BY RX/DR IN RCRD: CPT | Mod: CPTII,S$GLB,, | Performed by: SPECIALIST

## 2024-05-02 ENCOUNTER — PATIENT MESSAGE (OUTPATIENT)
Dept: OBSTETRICS AND GYNECOLOGY | Facility: CLINIC | Age: 44
End: 2024-05-02
Payer: COMMERCIAL

## 2024-05-02 ENCOUNTER — TELEPHONE (OUTPATIENT)
Dept: OBSTETRICS AND GYNECOLOGY | Facility: CLINIC | Age: 44
End: 2024-05-02
Payer: COMMERCIAL

## 2024-05-02 NOTE — TELEPHONE ENCOUNTER
----- Message from Geovanna Washington sent at 5/1/2024  3:53 PM CDT -----  Contact: Alise Oleary is calling to receive a call back at .[phone]. Reports being out of town next week and is needing a call back to reschedule.

## 2024-05-09 DIAGNOSIS — Z01.419 ROUTINE GYNECOLOGICAL EXAMINATION: ICD-10-CM

## 2024-05-09 DIAGNOSIS — Z01.89 ENCOUNTER FOR ROUTINE LABORATORY TESTING: ICD-10-CM

## 2024-05-09 DIAGNOSIS — Z01.89 ENCOUNTER FOR ROUTINE LABORATORY TESTING: Primary | ICD-10-CM

## 2024-05-09 DIAGNOSIS — Z80.41 FHX: OVARIAN CANCER: Primary | ICD-10-CM

## 2024-05-20 LAB
ALBUMIN SERPL-MCNC: 4.2 G/DL (ref 3.5–5.2)
ALBUMIN/GLOB SERPL ELPH: 1.7 {RATIO} (ref 1–2.7)
ALP ISOS SERPL LEV INH-CCNC: 66 U/L (ref 35–105)
ALT (SGPT): 15 U/L (ref 0–33)
ANION GAP SERPL CALC-SCNC: 11 MMOL/L (ref 8–17)
AST SERPL-CCNC: 17 U/L (ref 0–32)
BILIRUBIN, TOTAL: 0.34 MG/DL (ref 0–1.2)
BUN/CREAT SERPL: 17.9 (ref 6–20)
CALCIUM SERPL-MCNC: 9 MG/DL (ref 8.6–10.2)
CANCER AG125 SERPL-ACNC: 10.5 U/ML (ref 0–38.1)
CARBON DIOXIDE, CO2: 26 MMOL/L (ref 22–29)
CHLORIDE: 106 MMOL/L (ref 98–107)
CHOLEST SERPL-MSCNC: 222 MG/DL (ref 100–200)
CREAT SERPL-MCNC: 0.9 MG/DL (ref 0.5–0.9)
E2: 80.7 PG/ML
ESTIMATED AVERAGE GLUCOSE: 106 MG/DL
FSH: 7.93 MIU/ML
GFR ESTIMATION: 79.85 ML/MIN/1.73M2
GLOBULIN: 2.5 G/DL (ref 1.5–4.5)
GLUCOSE: 99 MG/DL (ref 74–106)
HBA1C MFR BLD: 5.3 % (ref 4–6)
HDLC SERPL-MCNC: 75 MG/DL
LDL/HDL RATIO: 1.8 (ref 1–3)
LDLC SERPL CALC-MCNC: 133 MG/DL (ref 0–100)
LH: 8.09 MIU/ML
POTASSIUM: 4.4 MMOL/L (ref 3.5–5.1)
PROT SNV-MCNC: 6.7 G/DL (ref 6.4–8.3)
SODIUM: 143 MMOL/L (ref 136–145)
T4, FREE: 0.87 NG/DL (ref 0.93–1.7)
TRIGL SERPL-MCNC: 70 MG/DL (ref 0–150)
TSH SERPL DL<=0.005 MIU/L-ACNC: 2.09 UIU/ML (ref 0.27–4.2)
UREA NITROGEN (BUN): 16.1 MG/DL (ref 6–20)

## 2024-05-22 ENCOUNTER — TELEPHONE (OUTPATIENT)
Dept: OBSTETRICS AND GYNECOLOGY | Facility: CLINIC | Age: 44
End: 2024-05-22
Payer: COMMERCIAL

## 2024-05-22 DIAGNOSIS — R53.83 FATIGUE, UNSPECIFIED TYPE: ICD-10-CM

## 2024-05-22 DIAGNOSIS — E03.9 HYPOTHYROIDISM, UNSPECIFIED TYPE: Primary | ICD-10-CM

## 2024-05-22 RX ORDER — TESTOSTERONE
POWDER (GRAM) MISCELLANEOUS
Qty: 1 EACH | Refills: 5 | Status: SHIPPED | OUTPATIENT
Start: 2024-05-22

## 2024-05-22 RX ORDER — LEVOTHYROXINE SODIUM 25 UG/1
25 TABLET ORAL
Qty: 30 TABLET | Refills: 11 | Status: SHIPPED | OUTPATIENT
Start: 2024-05-22 | End: 2025-05-22

## 2024-05-22 NOTE — TELEPHONE ENCOUNTER
Lab results & treatment plan discussed with pt per Dr. Dawson via phone call. Synthroid and testosterone marya sent to pharmacy, will repeat labs in 1 mos.

## 2024-05-29 ENCOUNTER — PATIENT MESSAGE (OUTPATIENT)
Dept: OBSTETRICS AND GYNECOLOGY | Facility: CLINIC | Age: 44
End: 2024-05-29
Payer: COMMERCIAL

## 2024-06-11 LAB
T4, FREE: 1.01 NG/DL (ref 0.93–1.7)
TESTOST SERPL-MCNC: 19 NG/DL (ref 8.4–48.1)
TSH SERPL DL<=0.005 MIU/L-ACNC: 1.35 UIU/ML (ref 0.27–4.2)

## 2024-06-12 ENCOUNTER — OFFICE VISIT (OUTPATIENT)
Dept: OBSTETRICS AND GYNECOLOGY | Facility: CLINIC | Age: 44
End: 2024-06-12
Payer: COMMERCIAL

## 2024-06-12 VITALS
WEIGHT: 173.38 LBS | DIASTOLIC BLOOD PRESSURE: 70 MMHG | HEART RATE: 61 BPM | SYSTOLIC BLOOD PRESSURE: 106 MMHG | BODY MASS INDEX: 27.16 KG/M2

## 2024-06-12 DIAGNOSIS — Z01.419 WELL WOMAN EXAM WITH ROUTINE GYNECOLOGICAL EXAM: Primary | ICD-10-CM

## 2024-06-12 PROCEDURE — 3078F DIAST BP <80 MM HG: CPT | Mod: CPTII,S$GLB,,

## 2024-06-12 PROCEDURE — 1160F RVW MEDS BY RX/DR IN RCRD: CPT | Mod: CPTII,S$GLB,,

## 2024-06-12 PROCEDURE — 1159F MED LIST DOCD IN RCRD: CPT | Mod: CPTII,S$GLB,,

## 2024-06-12 PROCEDURE — 3074F SYST BP LT 130 MM HG: CPT | Mod: CPTII,S$GLB,,

## 2024-06-12 PROCEDURE — 3008F BODY MASS INDEX DOCD: CPT | Mod: CPTII,S$GLB,,

## 2024-06-12 PROCEDURE — 99396 PREV VISIT EST AGE 40-64: CPT | Mod: S$GLB,,,

## 2024-06-12 PROCEDURE — 3044F HG A1C LEVEL LT 7.0%: CPT | Mod: CPTII,S$GLB,,

## 2024-06-12 RX ORDER — TRIAMCINOLONE ACETONIDE 1 MG/G
CREAM TOPICAL
COMMUNITY
Start: 2024-06-10 | End: 2025-06-10

## 2024-06-12 RX ORDER — IVERMECTIN 10 MG/G
CREAM TOPICAL
COMMUNITY
Start: 2024-06-10

## 2024-06-12 RX ORDER — DOXYCYCLINE 100 MG/1
100 CAPSULE ORAL
COMMUNITY
Start: 2024-06-10 | End: 2024-07-10

## 2024-06-12 NOTE — PROGRESS NOTES
Subjective:      Patient ID: Alise Deleon is a 44 y.o. female who presents for evaluation today.    Chief Complaint:    Well Woman      History of Present Illness  HPI  Annual Exam (Premenopausal) - Patient presents for annual exam. The patient has no complaints today. The patient is sexually active. GYN screening history: last pap: was normal and last mammogram: was normal. The patient wears seatbelts: yes. The patient participates in regular exercise: yes. Has the patient ever been transfused or tattooed?: not asked. The patient reports that there is not domestic violence in her life. She reports regular periods, they are manageable. She has had a tubal. She sees Dr. Esteban Kessler for her primary care, will see him in July to review her thyroid. She is feeling much better on her synthroid x3 weeks, recent labs were normal. She has not tried testosterone marya yet, but may consider. She sees Breast specialist Dr. Johnston in  yearly, doing mammo & MRIs with her family history. No GI or  problems. No dyspareunia.    GYN History  Patient's last menstrual period was 05/30/2024 (exact date).   Date of Last Pap: Pap smear completed today with HPV co-testing    VITALS  /70   Pulse 61   Wt 78.7 kg (173 lb 6.4 oz)   LMP 05/30/2024 (Exact Date)   BMI 27.16 kg/m²   Weight: 78.7 kg (173 lb 6.4 oz)         PAST MEDICAL HISTORY  Past Medical History:   Diagnosis Date    Acne     At high risk for breast cancer     Excessive menstruation     Family history of malignant neoplasm of breast 02/18/2023    Family history of malignant neoplasm of ovary 02/18/2023    Family history of ovarian cancer     Fissure, anal     Hair loss     History of chickenpox     Ovary palpable     Screening mammogram for high-risk patient 03/09/2024       PAST SURGICAL HISTORY  Past Surgical History:   Procedure Laterality Date    BREAST CYST ASPIRATION Left     LAPAROSCOPY WITH LYSIS OF ADHESIONS      LEFT CYSTECTOMY      TONSILLECTOMY       TUBAL LIGATION Bilateral     WISDOM TOOTH EXTRACTION         SOCIAL HISTORY  Social History     Tobacco Use   Smoking Status Never   Smokeless Tobacco Never   ,   Social History     Substance and Sexual Activity   Alcohol Use Yes    Alcohol/week: 1.0 standard drink of alcohol    Types: 1 Glasses of wine per week    Comment: OCCASIONAL         MEDICATIONS  Outpatient Medications Marked as Taking for the 6/12/24 encounter (Office Visit) with Mary Holt NP   Medication Sig Dispense Refill    doxycycline (MONODOX) 100 MG capsule Take 100 mg by mouth.      ivermectin (SOOLANTRA) 1 % Crea Apply to face once daily      levothyroxine (SYNTHROID) 25 MCG tablet Take 1 tablet (25 mcg total) by mouth before breakfast. 30 tablet 11    testosterone, bulk, Powd COMPOUND TO TESTOSTERONE FILI 4MG, 2MG BID, #30 DAY SUPPLY, 5 REFILLS 1 each 5    triamcinolone acetonide 0.1% (KENALOG) 0.1 % cream Apply to the affected area twice daily for 2 weeks, then use as needed- chest           Review of Systems   Review of Systems   Constitutional:  Negative for activity change.   Eyes:  Negative for visual disturbance.   Respiratory:  Negative for shortness of breath.    Cardiovascular:  Negative for chest pain.   Gastrointestinal:  Negative for abdominal pain.   Genitourinary:  Negative for vaginal bleeding.        No abnormal vaginal bleeding   Musculoskeletal:  Negative for back pain.   Integumentary:  Negative for rash and breast mass.   Neurological:  Negative for numbness.   Psychiatric/Behavioral:          No mood disturbance or changes    Breast: Negative for mass          Objective:     Physical Exam:   Constitutional: She is oriented to person, place, and time. She appears well-developed. She is cooperative.    HENT:   Head: Normocephalic.     Neck: Trachea normal. No thyromegaly present.    Cardiovascular:  Normal rate, regular rhythm and normal heart sounds.             Pulmonary/Chest: Effort normal and breath sounds  normal. Right breast exhibits no mass, no nipple discharge and no skin change. Left breast exhibits no mass, no nipple discharge and no skin change.        Abdominal: Soft. There is no abdominal tenderness. There is no rebound and no guarding.     Genitourinary:    Vagina and uterus normal.      Pelvic exam was performed with patient supine.   The external female genitalia was normal.     Labial bartholins normal.There is no lesion on the right labia. There is no lesion on the left labia. Cervix is normal. Right adnexum displays no mass and no tenderness. Left adnexum displays no mass and no tenderness. Cervix exhibits no discharge.    pap smear completedUterus is not enlarged and not tender.              Lymphadenopathy:        Head (right side): No submental and no submandibular adenopathy present.        Head (left side): No submental and no submandibular adenopathy present.     She has no cervical adenopathy.    Neurological: She is alert and oriented to person, place, and time.    Skin: Skin is warm.    Psychiatric: She has a normal mood and affect. Her speech is normal and behavior is normal. Thought content normal.          Assessment:        1. Well woman exam with routine gynecological exam       Well woman exam with routine gynecological exam  -     Liquid-based pap smear, screening         Plan:     Patient instructed to contact the clinic should any questions or concerns arise prior to her next office visit. Patient is happy with the plan of care at this time, verbalizes understanding and denies outstanding questions.      Pap  Mammogram  Self-breast exams  Continue breast imaging with Dr. Johnston  Consider annual health panel with Primary Care if not done  Colonoscopy as indicated  If you don't hear from the office regarding results within 1 week, please call  Follow up in 1 year for annual or sooner as needed  Chaperone present for exam

## 2024-06-14 LAB — Lab: NORMAL

## 2024-08-12 ENCOUNTER — TELEPHONE (OUTPATIENT)
Dept: OBSTETRICS AND GYNECOLOGY | Facility: CLINIC | Age: 44
End: 2024-08-12
Payer: COMMERCIAL

## 2024-08-12 NOTE — TELEPHONE ENCOUNTER
----- Message from Juli Lomeli sent at 8/12/2024  3:23 PM CDT -----  Contact: self  Type:  Sooner Apoointment Request    Caller is requesting a sooner appointment.  Caller declined first available appointment listed below.  Caller will not accept being placed on the waitlist and is requesting a message be sent to doctor.  Name of Caller:Alise Deleon  When is the first available appointment?01/2025  Symptoms:hormone replacement therapy  Would the patient rather a call back or a response via [facility]?   Best Call Back Number:[phone]  Additional Information:   [facility] - [place], [place] - [address], [address]  [address], [address]  [facility] [address]  Phone: [phone] Fax: [phone]   Field Number: 1 Bill And Render Text From Evaluation And Management Tab (Will Bill 19482): No Assessment: Appropriate reaction Day Of The Week Treatment Administered: Monday Time Dose Fractionation (Optional- Include Units If Applicable) Rx 2: 85 Total Number Of Fractions: 4 Total Number Of Fractions Rx 3: 15 Fractions / Week Rx 3: 5 Port Dimensions-X Axis In Cm: 2.5 Information: Selecting Yes will display possible errors in your note based on the variables you have selected. This validation is only offered as a suggestion for you. PLEASE NOTE THAT THE VALIDATION TEXT WILL BE REMOVED WHEN YOU FINALIZE YOUR NOTE. IF YOU WANT TO FAX A PRELIMINARY NOTE YOU WILL NEED TO TOGGLE THIS TO 'NO' IF YOU DO NOT WANT IT IN YOUR FAXED NOTE. Cumulative Dose In Cgy (Optional): 4511.71 Treatment Time In Min (Optional): 0.42 Prescription Used: 2 Dimensions-X Axis In Cm: 0.5 Treatment Time / Fractionation (Optional- Include Units): 0.40 Energy (Optional-Please Include Units): 100 kv Dose / Tx In Cgy (Optional): 268.38 Daily Fractionated Dose (Optional- Include Units): 255.6 cGy Validate Note Data (See Information Below): Yes Depth (Optional-Please Include Units): 3.08 mm Custom Shielding Preamble Text Will Not Be Included With Simple Simulations (.......... X X Y Cm): A lead shield of 0.762 mm thickness is utilized to form a molded, custom shield with a Fractionation Number: 17 Comments: RTOG 2 Field Size (Applicator): 3.0 cm Fractions / Week Rx 2: 3 Time Dose Fractionation (Optional- Include Units If Applicable): 18 Functional Status: 0 (fully active) Initial Radiation Treatment Planning (Will Render If Bill Simulation = Yes): The patient had a complete consultation regarding all applicable modalities for the treatment of their skin cancer and based on a variety of factors including the type of tumor, size, and location, the relevant medical history as well as local tissue factors, the functional status of the individual, the ability to perform necessary postoperative wound instructions and the need for simultaneous treatments as well as overall wound healing status, it was determined that the patient would begin radiation therapy treatment for skin cancer.  A full simulation and treatment device design was performed including the determination and formulation of appropriate simple and complex devices including lead shield of 0.762 mm thickness to form molded customized shielding to specifically correlate with the lesion size including treatment margin.  The custom lead shield is adequate to accommodate the appropriate applicator and provide adequate shielding around the treatment site.  The specific field applicator, shields, and devices both simple and complex as well as the specific patient setup is outlined below.  The patient was given a full consent for superficial radiation to both verbally and in writing and the full determination of patient's eligibility for treatment and selection is outlined on the patient eligibility and treatment selection form.  The specific superficial radiotherapy prescription was determined and was documented on the superficial radiotherapy prescription form.  A treatment calculation was also performed and documented on the treatment calculation form.  Based on the prescription, the patient was scheduled for a series of fractional treatments. Dose Per Fractionation In Cgy (Optional): 274.77 Daily Fractionated Dose (Optional- Include Units) Rx 2: 268.38 cGy Shielding Size (Optional- Include Units) Rx 2: 2.5 x 2.5 cm Total Dose (Optional-Please Include Units): 1022.40 cGy Additional Prescription Justification Text: If there is any interruption in treatment exceeding 5 days please see Decay and Dose Adjustment Calculation and complete treatment under Prescription 2. Detail Level: Detailed Intro Statement (Will Not Render If Left Blank): The patient is undergoing superficial radiation therapy for skin cancer and presents for weekly evaluation and management.  Per protocol and as documented on the flow sheet, the patient was questioned as to subjective redness, pruritus, pain, drainage, fatigue, or any other symptoms.  Objectively, the radiation area was evaluated with regards to erythema, atrophy, scale, crusting, erosion, ulceration, edema, purpura, tenderness, warmth, drainage, and any other findings.  The plan was extensively reviewed including the dose, and dosing schedule.  The simulation and clinical setup was also reviewed as was the external and any internal shields and based on this review the appropriateness and sufficiency of treatment was determined. Treatment Device Design After Initial Simulation Justification (Will Render If Bill For Treatment Devices = Yes): The patient is status post radiation simulation and is evaluated as to the use of additional devices for shielding and placement for radiation therapy. Custom Shielding Afterword Text Will Not Be Included With Simple Simulations (X X Y Cm............): port to correlate with the lesion size, including treatment margin. The custom lead shield is adequate to accommodate the appropriate applicator and provide adequate shielding around the treatment site. Additional shielding (as noted below) is used to protect sensitive, normal tissues. Fractionation Number (Evaluation): 14 Simple Simulation Preamble Text Will Be Included With Simple Simulations (.......... Indications): Simple simulation was performed today for the following reasons: Patient Positioning: Supine Total Dose (Optional-Please Include Units) Rx 2: 4830.84 cGy

## 2024-08-13 ENCOUNTER — TELEPHONE (OUTPATIENT)
Dept: OBSTETRICS AND GYNECOLOGY | Facility: CLINIC | Age: 44
End: 2024-08-13
Payer: COMMERCIAL

## 2024-08-13 DIAGNOSIS — Z79.890 HORMONE REPLACEMENT THERAPY (HRT): Primary | ICD-10-CM

## 2024-08-13 DIAGNOSIS — R61 NIGHT SWEATS: ICD-10-CM

## 2024-08-13 DIAGNOSIS — R23.2 HOT FLASHES: Primary | ICD-10-CM

## 2024-08-13 RX ORDER — PROGESTERONE 100 MG/1
100 CAPSULE ORAL NIGHTLY
Qty: 30 CAPSULE | Refills: 6 | Status: SHIPPED | OUTPATIENT
Start: 2024-08-13 | End: 2025-08-13

## 2024-08-13 RX ORDER — ESTRADIOL 0.05 MG/D
1 FILM, EXTENDED RELEASE TRANSDERMAL
Qty: 8 PATCH | Refills: 3 | Status: SHIPPED | OUTPATIENT
Start: 2024-08-15 | End: 2025-08-15

## 2024-08-13 RX ORDER — TESTOSTERONE
POWDER (GRAM) MISCELLANEOUS
Qty: 30 G | Refills: 5 | Status: SHIPPED | OUTPATIENT
Start: 2024-08-13

## 2024-08-13 NOTE — TELEPHONE ENCOUNTER
We discussed HRT   she is having hot flashes and night sweats and decrease sexual desire.   She would like to do labs but would like to try a low dose.  R&B discussed in detail   prior salpingectomy

## 2024-08-14 LAB
E2: 89.2 PG/ML
FSH: 3.85 MIU/ML
LH: 2.42 MIU/ML

## 2024-08-15 NOTE — PROGRESS NOTES
Please call    Angel Carrero,  your labs really do not reflect menopause at all nor cammy menopause.  You certainly can try the hormones and again you will know in a month but I'm not sure this will be the answer at this time

## 2024-09-10 DIAGNOSIS — R79.89 LOW TESTOSTERONE: ICD-10-CM

## 2024-09-10 DIAGNOSIS — R23.2 HOT FLASHES: Primary | ICD-10-CM

## 2024-09-10 DIAGNOSIS — Z01.89 ENCOUNTER FOR ROUTINE LABORATORY TESTING: ICD-10-CM

## 2024-09-10 DIAGNOSIS — Z79.890 HORMONE REPLACEMENT THERAPY (HRT): ICD-10-CM

## 2024-09-10 DIAGNOSIS — R53.83 FATIGUE, UNSPECIFIED TYPE: ICD-10-CM

## 2024-09-10 DIAGNOSIS — R61 NIGHT SWEATS: ICD-10-CM

## 2024-09-10 DIAGNOSIS — E03.9 HYPOTHYROIDISM, UNSPECIFIED TYPE: ICD-10-CM

## 2024-09-11 LAB
CHOLEST SERPL-MSCNC: 233 MG/DL (ref 100–200)
E2: 52.6 PG/ML
FREE TESTOSTERONE: 0.09 NG/DL (ref 0–1)
FSH: 7.86 MIU/ML
GLUCOSE: 94 MG/DL (ref 74–106)
HDLC SERPL-MCNC: 71 MG/DL
LDL/HDL RATIO: 2.1 (ref 1–3)
LDLC SERPL CALC-MCNC: 150.8 MG/DL (ref 0–100)
LH: 9.47 MIU/ML
PROGEST SERPL-MCNC: 1.72 NG/ML
TESTOST SERPL-MCNC: 8.25 NG/DL (ref 8.4–48.1)
TRIGL SERPL-MCNC: 56 MG/DL (ref 0–150)
TSH SERPL DL<=0.005 MIU/L-ACNC: 1.35 UIU/ML (ref 0.27–4.2)
VITAMIN D (25OHD): 29 NG/ML

## 2024-09-17 NOTE — PROGRESS NOTES
Date of Service: 10/8/2024    Chief Complaint:   Alise Deleon is a 44 y.o. female presenting today for her 6-month evaluation. She is due for an ultrasound.  She reports no interval changes.     History of Present Illness:   Mrs. Deleon first presented on November 14, 2016 to establish ongoing breast care. She has a worrisome family history. Her HILLARY life time risk of breast cancer is 19.5% (2018). MyRisk Negative (2018). MD::: Blayne Purvis MD; Shan Dawson MD    Past Medical History:   Diagnosis Date    Acne     At high risk for breast cancer     Excessive menstruation     Family history of malignant neoplasm of breast 02/18/2023    Family history of malignant neoplasm of ovary 02/18/2023    Family history of ovarian cancer     Fissure, anal     Hair loss     History of chickenpox     Hypothyroidism, unspecified     Ovary palpable     Screening mammogram for high-risk patient 03/09/2024      Past Surgical History:   Procedure Laterality Date    BREAST CYST ASPIRATION Left     LAPAROSCOPY WITH LYSIS OF ADHESIONS      LEFT CYSTECTOMY      TONSILLECTOMY      TUBAL LIGATION Bilateral     WISDOM TOOTH EXTRACTION          Current Outpatient Medications:     estradiol 0.05 mg/24 hr td ptsw (VIVELLE-DOT) 0.05 mg/24 hr, Place 1 patch onto the skin twice a week., Disp: 8 patch, Rfl: 3    ivermectin (SOOLANTRA) 1 % Crea, Apply to face once daily, Disp: , Rfl:     levothyroxine (SYNTHROID) 25 MCG tablet, Take 1 tablet (25 mcg total) by mouth before breakfast., Disp: 30 tablet, Rfl: 11    progesterone (PROMETRIUM) 100 MG capsule, Take 1 capsule (100 mg total) by mouth nightly., Disp: 30 capsule, Rfl: 6    testosterone, bulk, Powd, COMPOUND TO TESTOSTERONE FILI 4MG, 2MG BID, #30 DAY SUPPLY, 5 REFILLS, Disp: 1 each, Rfl: 5    triamcinolone acetonide 0.1% (KENALOG) 0.1 % cream, Apply to the affected area twice daily for 2 weeks, then use as needed- chest, Disp: , Rfl:    Review of patient's allergies indicates:  No  Known Allergies   Social History     Tobacco Use    Smoking status: Never    Smokeless tobacco: Never   Substance Use Topics    Alcohol use: Yes     Alcohol/week: 1.0 standard drink of alcohol     Types: 1 Glasses of wine per week     Comment: OCCASIONAL       Family History   Problem Relation Name Age of Onset    Hypertension Mother Meek Diaz     Ovarian cancer Mother Meek Diaz 70    Hypertension Father      Heart disease Father      Throat cancer Father      No Known Problems Sister      No Known Problems Brother      Breast cancer Maternal Grandmother Mariann Botello     No Known Problems Maternal Grandfather      No Known Problems Paternal Grandmother      No Known Problems Paternal Grandfather      Breast cancer Other          aunt        Review of Systems   Integumentary:  Negative for color change, rash, mole/lesion, thickening/swelling, dimpling of skin, drainage  Breast: Negative for mass and tenderness     Physical Exam   Constitutional: She appears well-developed. She is cooperative.   HENT: Normocephalic.   Cardiovascular:  Normal rate and regular rhythm.            Pulmonary/Chest: She exhibits no tenderness and no bony tenderness.   Abdominal: Soft. Normal appearance.   Musculoskeletal: Intact with no deficits  Neurological: She is alert.   Skin: No rash noted.   Breast and Chest Wall Evaluation:   Right breast exhibits no mass, no nipple discharge, no skin change and no tenderness.     Left breast exhibits no mass, no nipple discharge, no skin change and no tenderness.     Lymphadenopathy: No supraclavicular or axillary adenopathy.    ULTRASOUND EVALUATION and REPORT    Bilateral real-time Ultrasound was performed by me.  All four quadrants of the breast, the subareolar and axillary basins were scanned.    She has some heterogeneous dense fibroglandular tissue bilaterally.    Right Breast: She has normal tissues in the right breast; there's no disruption of the tissue planes and no abnormal  shadowing; she has normal skin thickness with no subcutaneous nodules of skin thickening; NEM     Left Breast: She has normal tissues in the left breast; there's no disruption of the tissue planes and no abnormal shadowing; she has normal skin thickness with no subcutaneous nodules or skin thickening; NEM     Axillae: There are no abnormal lymph nodes seen bilaterally.     Impression: Some dense but normal tissue bilaterally with no solid/suspicious masses noted. No LAD in bilateral axillae.  BIRADS: Category 2 - Benign Finding.    Findings were discussed with patient in real time and a hand written report was given to her at the conclusion of the exam.      ASSESSMENT and PLAN OF CARE     1. Family history of malignant neoplasm of breast  Assessment & Plan:  We discussed her family history and how it could impact her own future risks.  We discussed family vs. genetic history and the importance and implications of each.  Genetic Counseling/Testing was offered, and all questions answered to her satisfaction.  She knows that as additional family members are diagnosed - she will need to let us know as this may change follow up and imaging recommendations.    We had a discussion concerning Breast Cancer Risk Reduction and current NCCN Guidelines. She knows that her risk can be lowered slightly with a healthy lifestyle and minimal ETOH use. Being physically active will also help. She should reduce or stay away from OCPs and HRT as possible.     We reviewed our findings today and her questions were answered.  She understands that her imaging and exams have remained stable (and show nothing concerning).  She is comfortable being followed in a conservative fashion.      She understands the importance of monthly self-breast examination and knows to report any and all changes as they occur.    NOTE:::We viewed her films together at today's visit.  We discussed the multiple views obtained and the important findings.  Even  benign changes were mentioned and her questions were answered.  She is to contact us if she has questions.        Medical Decision Making: It is my impression that the patient suffers from all the listed conditions.  Each of these conditions did affect my Plan of Care and all medical decisions that were rendered.  The medical decision making was of high difficulty based on her co-morbidities and her previous diagnosis of being a High-Risk Patient given her personal and family histories.   I have performed and reviewed all imaging and it has been discussed with her. I have reviewed and verified her allergies, list of medications, medical and surgical histories, social history, and a pertinent review of symptoms.    Follow up: 6 months and prn     For:  Physical Examination and MGM (S) at

## 2024-09-30 DIAGNOSIS — Z79.890 HORMONE REPLACEMENT THERAPY (HRT): ICD-10-CM

## 2024-09-30 DIAGNOSIS — R53.83 FATIGUE, UNSPECIFIED TYPE: ICD-10-CM

## 2024-09-30 RX ORDER — TESTOSTERONE
POWDER (GRAM) MISCELLANEOUS
Qty: 30 G | Refills: 5 | OUTPATIENT
Start: 2024-09-30

## 2024-09-30 RX ORDER — TESTOSTERONE
POWDER (GRAM) MISCELLANEOUS
Qty: 1 EACH | Refills: 5 | Status: SHIPPED | OUTPATIENT
Start: 2024-09-30

## 2024-10-08 ENCOUNTER — OFFICE VISIT (OUTPATIENT)
Dept: SURGERY | Facility: CLINIC | Age: 44
End: 2024-10-08
Payer: COMMERCIAL

## 2024-10-08 DIAGNOSIS — Z80.3 FAMILY HISTORY OF MALIGNANT NEOPLASM OF BREAST: Primary | ICD-10-CM

## 2024-10-08 PROCEDURE — 3044F HG A1C LEVEL LT 7.0%: CPT | Mod: CPTII,S$GLB,, | Performed by: SPECIALIST

## 2024-10-08 PROCEDURE — 99214 OFFICE O/P EST MOD 30 MIN: CPT | Mod: S$GLB,,, | Performed by: SPECIALIST

## 2024-10-08 PROCEDURE — 1159F MED LIST DOCD IN RCRD: CPT | Mod: CPTII,S$GLB,, | Performed by: SPECIALIST

## 2024-10-08 PROCEDURE — 99999 PR PBB SHADOW E&M-EST. PATIENT-LVL II: CPT | Mod: PBBFAC,,, | Performed by: SPECIALIST

## 2024-10-08 PROCEDURE — 1160F RVW MEDS BY RX/DR IN RCRD: CPT | Mod: CPTII,S$GLB,, | Performed by: SPECIALIST

## 2024-10-24 ENCOUNTER — OFFICE VISIT (OUTPATIENT)
Dept: OBSTETRICS AND GYNECOLOGY | Facility: CLINIC | Age: 44
End: 2024-10-24
Payer: COMMERCIAL

## 2024-10-24 VITALS
BODY MASS INDEX: 28.09 KG/M2 | DIASTOLIC BLOOD PRESSURE: 86 MMHG | HEIGHT: 67 IN | WEIGHT: 179 LBS | HEART RATE: 76 BPM | SYSTOLIC BLOOD PRESSURE: 128 MMHG

## 2024-10-24 DIAGNOSIS — R53.83 FATIGUE, UNSPECIFIED TYPE: ICD-10-CM

## 2024-10-24 DIAGNOSIS — R63.5 WEIGHT GAIN: Primary | ICD-10-CM

## 2024-10-24 DIAGNOSIS — N95.1 PERI-MENOPAUSAL: ICD-10-CM

## 2024-10-24 PROCEDURE — 1159F MED LIST DOCD IN RCRD: CPT | Mod: CPTII,,, | Performed by: OBSTETRICS & GYNECOLOGY

## 2024-10-24 PROCEDURE — 3079F DIAST BP 80-89 MM HG: CPT | Mod: CPTII,,, | Performed by: OBSTETRICS & GYNECOLOGY

## 2024-10-24 PROCEDURE — 99213 OFFICE O/P EST LOW 20 MIN: CPT | Mod: S$PBB,,, | Performed by: OBSTETRICS & GYNECOLOGY

## 2024-10-24 PROCEDURE — 3008F BODY MASS INDEX DOCD: CPT | Mod: CPTII,,, | Performed by: OBSTETRICS & GYNECOLOGY

## 2024-10-24 PROCEDURE — 3044F HG A1C LEVEL LT 7.0%: CPT | Mod: CPTII,,, | Performed by: OBSTETRICS & GYNECOLOGY

## 2024-10-24 PROCEDURE — 3074F SYST BP LT 130 MM HG: CPT | Mod: CPTII,,, | Performed by: OBSTETRICS & GYNECOLOGY

## 2024-10-24 RX ORDER — TIRZEPATIDE 5 MG/.5ML
5 INJECTION, SOLUTION SUBCUTANEOUS
Qty: 4 PEN | Refills: 12 | Status: SHIPPED | OUTPATIENT
Start: 2024-10-24

## 2024-10-24 RX ORDER — TESTOSTERONE
POWDER (GRAM) MISCELLANEOUS
Qty: 1 EACH | Refills: 5 | Status: SHIPPED | OUTPATIENT
Start: 2024-10-24

## 2024-10-24 NOTE — PROGRESS NOTES
Subjective:       Patient ID: Alise Deleon is a 44 y.o. female.    Chief Complaint:  Consult (Weight gain)      History of Present Illness  She is doing well.  No new health issues,  No abnormal bleeding, No GI or Gu concerns,  No dyspareunia.   She has tried to loose weight and has seen functional med dr and a  but she saw eliza and started synthroid and she felt so much better but no weight  She did try keto diet and got her hormones checked.   She started the testosterone two times a day.  She Is taking progesterone 100mg at bedtime. She is not cycling it.   She is taking estradiol 0.5 patch      aug her fasting was 102   she rechecked and it went to 95    she If frustrated.     She started hrt and her cholesterol was elevated  some slight improvement   We discussed GLP1s  she has interest        HPI      GYN & OB History  Patient's last menstrual period was 10/07/2024 (exact date).     Date of Last Pap: 2024    OB History    Para Term  AB Living   2 2       2   SAB IAB Ectopic Multiple Live Births                  # Outcome Date GA Lbr Brendan/2nd Weight Sex Type Anes PTL Lv   2 Para            1 Para                Review of Systems  Review of Systems   Constitutional:  Negative for activity change.   Eyes:  Negative for visual disturbance.   Respiratory:  Negative for shortness of breath.    Cardiovascular:  Negative for chest pain.   Gastrointestinal:  Negative for abdominal pain.   Genitourinary:  Negative for vaginal bleeding.        No abnormal vaginal bleeding   Musculoskeletal:  Negative for back pain.   Integumentary:  Negative for rash and breast mass.   Neurological:  Negative for numbness.   Psychiatric/Behavioral:          No mood disturbance or changes    Breast: Negative for mass            Objective:    Physical Exam       Assessment:        1. Weight gain    2. BMI 28.0-28.9,adult    3. Tabatha-menopausal               Plan:         Doing well with her HRT    cont what  she is doing   she has elevated cholesterol     zepbound  Mammogram  Follow up one year or sooner if needed  Self breast exams  Consider health profile if labs not done with PCP  Recommend colonoscopy   Pt is aware we call all results. If she does not hear from our office regarding her result within a week of having a study or procedure performed she is to call the office so that we can research the result for her as I do not know when she is scheduling her procedures.   Chaperone was present

## 2024-10-28 ENCOUNTER — TELEPHONE (OUTPATIENT)
Dept: OBSTETRICS AND GYNECOLOGY | Facility: CLINIC | Age: 44
End: 2024-10-28
Payer: COMMERCIAL

## 2024-10-31 ENCOUNTER — PATIENT MESSAGE (OUTPATIENT)
Dept: OBSTETRICS AND GYNECOLOGY | Facility: CLINIC | Age: 44
End: 2024-10-31
Payer: COMMERCIAL

## 2024-10-31 DIAGNOSIS — R63.5 WEIGHT GAIN: ICD-10-CM

## 2024-10-31 RX ORDER — TIRZEPATIDE 2.5 MG/.5ML
2.5 INJECTION, SOLUTION SUBCUTANEOUS
Qty: 4 PEN | Refills: 4 | Status: SHIPPED | OUTPATIENT
Start: 2024-10-31

## 2024-11-20 DIAGNOSIS — R63.5 WEIGHT GAIN: ICD-10-CM

## 2024-11-20 RX ORDER — TIRZEPATIDE 2.5 MG/.5ML
2.5 INJECTION, SOLUTION SUBCUTANEOUS
Qty: 4 PEN | Refills: 4 | Status: SHIPPED | OUTPATIENT
Start: 2024-11-20

## 2024-11-27 DIAGNOSIS — R63.5 WEIGHT GAIN: Primary | ICD-10-CM

## 2024-12-03 ENCOUNTER — OFFICE VISIT (OUTPATIENT)
Dept: OBSTETRICS AND GYNECOLOGY | Facility: CLINIC | Age: 44
End: 2024-12-03
Payer: COMMERCIAL

## 2024-12-03 VITALS
SYSTOLIC BLOOD PRESSURE: 110 MMHG | BODY MASS INDEX: 25.56 KG/M2 | DIASTOLIC BLOOD PRESSURE: 75 MMHG | WEIGHT: 163.19 LBS | HEART RATE: 66 BPM

## 2024-12-03 DIAGNOSIS — E03.9 ACQUIRED HYPOTHYROIDISM: Primary | ICD-10-CM

## 2024-12-03 DIAGNOSIS — R63.5 WEIGHT GAIN: ICD-10-CM

## 2024-12-03 DIAGNOSIS — E78.00 HYPERCHOLESTEREMIA: ICD-10-CM

## 2024-12-03 DIAGNOSIS — R61 NIGHT SWEATS: ICD-10-CM

## 2024-12-03 DIAGNOSIS — R23.2 HOT FLASHES: ICD-10-CM

## 2024-12-03 DIAGNOSIS — Z01.419 WELL WOMAN EXAM: ICD-10-CM

## 2024-12-03 RX ORDER — PROGESTERONE 100 MG/1
100 CAPSULE ORAL NIGHTLY
Qty: 90 CAPSULE | Refills: 3 | Status: SHIPPED | OUTPATIENT
Start: 2024-12-03 | End: 2025-12-03

## 2024-12-03 RX ORDER — LEVOTHYROXINE SODIUM 25 UG/1
25 TABLET ORAL
Qty: 90 TABLET | Refills: 3 | Status: SHIPPED | OUTPATIENT
Start: 2024-12-03 | End: 2025-12-03

## 2024-12-03 RX ORDER — ESTRADIOL 0.05 MG/D
1 FILM, EXTENDED RELEASE TRANSDERMAL
Qty: 24 PATCH | Refills: 3 | Status: SHIPPED | OUTPATIENT
Start: 2024-12-05 | End: 2025-12-05

## 2024-12-03 RX ORDER — TIRZEPATIDE 5 MG/.5ML
5 INJECTION, SOLUTION SUBCUTANEOUS
Qty: 4 PEN | Refills: 12 | Status: SHIPPED | OUTPATIENT
Start: 2024-12-03

## 2024-12-03 NOTE — PROGRESS NOTES
Subjective:       Patient ID: Alise Deleon is a 44 y.o. female.    Chief Complaint:  Medication Management (PATIENT WAS STARTED ON ZEPBOUND, STATES IT IS WORKING WELL. )      History of Present Illness  She started . The zepbound  she is doing great and has lost weigh   she feels good and wants to continue    she is getting the brand  A few weeks ago and is doing well.  She has noted benefits      GYN & OB History  Patient's last menstrual period was 2024.     Date of Last Pap: 2024    OB History    Para Term  AB Living   2 2       2   SAB IAB Ectopic Multiple Live Births                  # Outcome Date GA Lbr Brendan/2nd Weight Sex Type Anes PTL Lv   2 Para            1 Para                Review of Systems  Review of Systems          Objective:    Physical Exam       Assessment:        1. Acquired hypothyroidism    2. Hot flashes    3. Night sweats    4. Weight gain    5. Well woman exam    6. Hypercholesteremia                 Plan:         She is doing well   will continue the zepbound     Continue the medicine as discussed  She is to return for any reason.   Follow up in 6 months is recommended to re-discuss  but she is aware that if any side effects develop or concerns or if symptoms redevelop to please call or come in.

## 2024-12-26 ENCOUNTER — PATIENT MESSAGE (OUTPATIENT)
Dept: OBSTETRICS AND GYNECOLOGY | Facility: CLINIC | Age: 44
End: 2024-12-26
Payer: COMMERCIAL

## 2024-12-26 DIAGNOSIS — R63.5 WEIGHT GAIN: Primary | ICD-10-CM

## 2025-01-06 DIAGNOSIS — R63.5 WEIGHT GAIN: Primary | ICD-10-CM

## 2025-01-06 RX ORDER — TIRZEPATIDE 2.5 MG/.5ML
2.5 INJECTION, SOLUTION SUBCUTANEOUS
Qty: 4 PEN | Refills: 3 | Status: SHIPPED | OUTPATIENT
Start: 2025-01-06

## 2025-01-13 ENCOUNTER — PATIENT MESSAGE (OUTPATIENT)
Dept: OBSTETRICS AND GYNECOLOGY | Facility: CLINIC | Age: 45
End: 2025-01-13
Payer: COMMERCIAL

## 2025-01-14 DIAGNOSIS — F41.9 ANXIETY: Primary | ICD-10-CM

## 2025-01-14 RX ORDER — ALPRAZOLAM 0.25 MG/1
0.25 TABLET ORAL 3 TIMES DAILY PRN
Qty: 30 TABLET | Refills: 0 | Status: SHIPPED | OUTPATIENT
Start: 2025-01-14 | End: 2026-01-14

## 2025-01-14 NOTE — TELEPHONE ENCOUNTER
Per Dr Samir nash to send xanax at patients request.   Patient request refill. Allergies reviewed. Pharmacy up to date. Medication pending. Please approve.

## 2025-02-06 ENCOUNTER — PATIENT MESSAGE (OUTPATIENT)
Dept: OBSTETRICS AND GYNECOLOGY | Facility: CLINIC | Age: 45
End: 2025-02-06
Payer: COMMERCIAL

## 2025-02-06 DIAGNOSIS — F41.9 ANXIETY AND DEPRESSION: Primary | ICD-10-CM

## 2025-02-06 DIAGNOSIS — F32.A ANXIETY AND DEPRESSION: Primary | ICD-10-CM

## 2025-02-06 RX ORDER — ESCITALOPRAM OXALATE 10 MG/1
10 TABLET ORAL DAILY
Qty: 90 TABLET | Refills: 2 | Status: SHIPPED | OUTPATIENT
Start: 2025-02-06 | End: 2026-02-06

## 2025-03-13 ENCOUNTER — OFFICE VISIT (OUTPATIENT)
Dept: OBSTETRICS AND GYNECOLOGY | Facility: CLINIC | Age: 45
End: 2025-03-13
Payer: COMMERCIAL

## 2025-03-13 VITALS
BODY MASS INDEX: 24.96 KG/M2 | SYSTOLIC BLOOD PRESSURE: 118 MMHG | HEART RATE: 60 BPM | DIASTOLIC BLOOD PRESSURE: 75 MMHG | WEIGHT: 159 LBS | HEIGHT: 67 IN

## 2025-03-13 DIAGNOSIS — Z78.0 MENOPAUSE: Primary | ICD-10-CM

## 2025-03-13 DIAGNOSIS — F41.9 ANXIETY AND DEPRESSION: ICD-10-CM

## 2025-03-13 DIAGNOSIS — F32.A ANXIETY AND DEPRESSION: ICD-10-CM

## 2025-03-13 PROCEDURE — 3008F BODY MASS INDEX DOCD: CPT | Mod: CPTII,,, | Performed by: OBSTETRICS & GYNECOLOGY

## 2025-03-13 PROCEDURE — 1159F MED LIST DOCD IN RCRD: CPT | Mod: CPTII,,, | Performed by: OBSTETRICS & GYNECOLOGY

## 2025-03-13 PROCEDURE — 3074F SYST BP LT 130 MM HG: CPT | Mod: CPTII,,, | Performed by: OBSTETRICS & GYNECOLOGY

## 2025-03-13 PROCEDURE — 3078F DIAST BP <80 MM HG: CPT | Mod: CPTII,,, | Performed by: OBSTETRICS & GYNECOLOGY

## 2025-03-13 PROCEDURE — 99213 OFFICE O/P EST LOW 20 MIN: CPT | Mod: S$PBB,,, | Performed by: OBSTETRICS & GYNECOLOGY

## 2025-03-13 RX ORDER — CLONAZEPAM 0.5 MG/1
0.5 TABLET ORAL 2 TIMES DAILY
Qty: 30 TABLET | Refills: 1 | Status: SHIPPED | OUTPATIENT
Start: 2025-03-13 | End: 2026-03-13

## 2025-03-13 NOTE — PROGRESS NOTES
Subjective:       Patient ID: Alise Deleon is a 45 y.o. female.    Chief Complaint:  Consult      History of Present Illness  She started  lexapro  for sig anxiety and started with 5mg on  and 10 mg a week later.  She feels she can function now and is feeling better now.    She is on progesteron at night and she is on estradiol and testosterone  two clicks every other day.  She had backed it off.     A few weeks ago and is doing well.  She has noted benefits      GYN & OB History  No LMP recorded.     Date of Last Pap: 2024    OB History    Para Term  AB Living   2 2    2   SAB IAB Ectopic Multiple Live Births             # Outcome Date GA Lbr Brendan/2nd Weight Sex Type Anes PTL Lv   2 Para            1 Para                Review of Systems  Review of Systems   Constitutional:  Negative for activity change.   Eyes:  Negative for visual disturbance.   Respiratory:  Negative for shortness of breath.    Cardiovascular:  Negative for chest pain.   Gastrointestinal:  Negative for abdominal pain.   Genitourinary:  Negative for vaginal bleeding.        No abnormal vaginal bleeding   Musculoskeletal:  Negative for back pain.   Integumentary:  Negative for rash and breast mass.   Neurological:  Negative for numbness.   Psychiatric/Behavioral:  The patient is nervous/anxious.         No mood disturbance or changes    Breast: Negative for mass            Objective:    Physical Exam:   Constitutional: She is oriented to person, place, and time. She appears well-developed. She is cooperative.    HENT:   Head: Normocephalic.     Neck: Trachea normal. No thyromegaly present.    Cardiovascular:  Normal rate, regular rhythm and normal heart sounds.             Pulmonary/Chest: Effort normal and breath sounds normal. Right breast exhibits no mass, no nipple discharge and no skin change. Left breast exhibits no mass, no nipple discharge and no skin change.        Abdominal: Soft. There is no abdominal  tenderness. There is no rebound and no guarding.     Genitourinary:    Vagina and uterus normal.      Pelvic exam was performed with patient supine.     Labial bartholins normal.There is no lesion on the right labia. There is no lesion on the left labia. Cervix is normal. Right adnexum displays no mass and no tenderness. Left adnexum displays no mass and no tenderness. Cervix exhibits no discharge. Uterus is not enlarged and not tender.              Lymphadenopathy:        Head (right side): No submental and no submandibular adenopathy present.        Head (left side): No submental and no submandibular adenopathy present.     She has no cervical adenopathy.    Neurological: She is alert and oriented to person, place, and time.    Skin: Skin is warm.    Psychiatric: She has a normal mood and affect. Her speech is normal and behavior is normal. Thought content normal.          Assessment:        1. Menopause    2. Anxiety and depression                 Plan:             Continue the medicine as discussed  She is to return for any reason.   Follow up in 6 months is recommended to re-discuss  but she is aware that if any side effects develop or concerns or if symptoms redevelop to please call or come in.

## 2025-03-19 ENCOUNTER — PATIENT MESSAGE (OUTPATIENT)
Dept: OBSTETRICS AND GYNECOLOGY | Facility: CLINIC | Age: 45
End: 2025-03-19
Payer: COMMERCIAL

## 2025-03-26 ENCOUNTER — RESULTS FOLLOW-UP (OUTPATIENT)
Dept: OBSTETRICS AND GYNECOLOGY | Facility: CLINIC | Age: 45
End: 2025-03-26

## 2025-03-26 NOTE — PROGRESS NOTES
Please call  Angel Carrero your estrogen really looks good I do not have any concerns with that particular level being too low or too high

## 2025-05-12 ENCOUNTER — PATIENT MESSAGE (OUTPATIENT)
Dept: OBSTETRICS AND GYNECOLOGY | Facility: CLINIC | Age: 45
End: 2025-05-12
Payer: COMMERCIAL

## 2025-05-16 ENCOUNTER — PATIENT MESSAGE (OUTPATIENT)
Dept: OBSTETRICS AND GYNECOLOGY | Facility: CLINIC | Age: 45
End: 2025-05-16
Payer: COMMERCIAL

## 2025-06-27 ENCOUNTER — OFFICE VISIT (OUTPATIENT)
Dept: OBSTETRICS AND GYNECOLOGY | Facility: CLINIC | Age: 45
End: 2025-06-27
Payer: COMMERCIAL

## 2025-06-27 VITALS
DIASTOLIC BLOOD PRESSURE: 71 MMHG | HEIGHT: 67 IN | HEART RATE: 72 BPM | BODY MASS INDEX: 24.45 KG/M2 | WEIGHT: 155.81 LBS | SYSTOLIC BLOOD PRESSURE: 104 MMHG

## 2025-06-27 DIAGNOSIS — Z80.41 FAMILY HISTORY OF OVARIAN CANCER: ICD-10-CM

## 2025-06-27 DIAGNOSIS — Z01.419 WELL WOMAN EXAM WITH ROUTINE GYNECOLOGICAL EXAM: Primary | ICD-10-CM

## 2025-06-27 NOTE — PROGRESS NOTES
"  Subjective:      Patient ID: Alise Deleon is a 45 y.o. female who presents for evaluation today.    Chief Complaint:    Well Woman      History of Present Illness  HPI  Annual Exam (Premenopausal) - Patient presents for annual exam. The patient has no complaints today. The patient is sexually active. GYN screening history: last pap: was normal and last mammogram: was normal. The patient wears seatbelts: yes. The patient participates in regular exercise: yes. Has the patient ever been transfused or tattooed?: not asked. The patient reports that there is not domestic violence in her life. She has had a tubal. She reports irregular cycles, periods are short & light. She feels benefit with her testosterone cream, estradiol patch, and Prometrium at night. She added klonopin with his helping her fall asleep. She is going to [facility] for mammograms with her family history. She denies GI or  problems.     GYN History  Patient's last menstrual period was 05/29/2025.   Date of Last Pap: Pap smear completed today with HPV co-testing    VITALS  /71   Pulse 72   Ht 5' 7" (1.702 m)   Wt 70.7 kg (155 lb 12.8 oz)   LMP 05/29/2025   BMI 24.40 kg/m²   Weight: 70.7 kg (155 lb 12.8 oz)   Height: 5' 7" (170.2 cm)     PAST MEDICAL HISTORY  Past Medical History:   Diagnosis Date    Acne     At high risk for breast cancer     Excessive menstruation     Family history of malignant neoplasm of breast 02/18/2023    Family history of malignant neoplasm of ovary 02/18/2023    Family history of ovarian cancer     Fissure, anal     Hair loss     History of chickenpox     Hypothyroidism, unspecified     Ovary palpable     Screening mammogram for high-risk patient 03/09/2024       PAST SURGICAL HISTORY  Past Surgical History:   Procedure Laterality Date    BREAST CYST ASPIRATION Left     LAPAROSCOPY WITH LYSIS OF ADHESIONS      LEFT CYSTECTOMY      TONSILLECTOMY      TUBAL LIGATION Bilateral     WISDOM TOOTH EXTRACTION         SOCIAL " HISTORY  Tobacco Use History[1],   Social History     Substance and Sexual Activity   Alcohol Use Yes    Alcohol/week: 1.0 standard drink of alcohol    Types: 1 Glasses of wine per week    Comment: OCCASIONAL         MEDICATIONS  Outpatient Medications Marked as Taking for the 6/27/25 encounter (Office Visit) with Mary Holt NP   Medication Sig Dispense Refill    ALPRAZolam (XANAX) 0.25 MG tablet Take 1 tablet (0.25 mg total) by mouth 3 (three) times daily as needed for Anxiety. 30 tablet 0    clonazePAM (KLONOPIN) 0.5 MG tablet Take 1 tablet (0.5 mg total) by mouth 2 (two) times daily. 30 tablet 1    EScitalopram oxalate (LEXAPRO) 10 MG tablet Take 1 tablet (10 mg total) by mouth once daily. 90 tablet 2    levothyroxine (SYNTHROID) 25 MCG tablet Take 1 tablet (25 mcg total) by mouth before breakfast. 90 tablet 3    progesterone (PROMETRIUM) 100 MG capsule Take 1 capsule (100 mg total) by mouth nightly. 90 capsule 3    tirzepatide, weight loss, (ZEPBOUND) 2.5 mg/0.5 mL PnIj Inject 2.5 mg into the skin every 7 days. 4 Pen 3         Review of Systems   Review of Systems   Constitutional:  Negative for activity change.   Eyes:  Negative for visual disturbance.   Respiratory:  Negative for shortness of breath.    Cardiovascular:  Negative for chest pain.   Gastrointestinal:  Negative for abdominal pain.   Genitourinary:  Negative for vaginal bleeding.        No abnormal vaginal bleeding   Musculoskeletal:  Negative for back pain.   Integumentary:  Negative for rash and breast mass.   Neurological:  Negative for numbness.   Psychiatric/Behavioral:          No mood disturbance or changes    Breast: Negative for mass          Objective:     Physical Exam:   Constitutional: She is oriented to person, place, and time. She appears well-developed. She is cooperative.    HENT:   Head: Normocephalic.     Neck: Trachea normal. No thyromegaly present.    Cardiovascular:  Normal rate, regular rhythm and normal heart  sounds.             Pulmonary/Chest: Effort normal and breath sounds normal. Right breast exhibits no mass, no nipple discharge and no skin change. Left breast exhibits no mass, no nipple discharge and no skin change.        Abdominal: Soft. There is no abdominal tenderness. There is no rebound and no guarding.     Genitourinary:    Vagina and uterus normal.      Pelvic exam was performed with patient in the lithotomy position.   The external female genitalia was normal.     Labial bartholins normal.There is no lesion on the right labia. There is no lesion on the left labia. Cervix is normal. Right adnexum displays no mass and no tenderness. Left adnexum displays no mass and no tenderness. Cervix exhibits no discharge.    pap smear completedUterus is not enlarged and not tender.              Lymphadenopathy:        Head (right side): No submental and no submandibular adenopathy present.        Head (left side): No submental and no submandibular adenopathy present.     She has no cervical adenopathy.    Neurological: She is alert and oriented to person, place, and time.    Skin: Skin is warm.    Psychiatric: She has a normal mood and affect. Her speech is normal and behavior is normal. Thought content normal.          Assessment:     Well woman exam with routine gynecological exam  -     Liquid-based pap smear, screening    Family history of ovarian cancer  -     US OB/GYN Procedure ([facility]); Future         Plan:     Patient instructed to contact the clinic should any questions or concerns arise prior to her next office visit. Patient is happy with the plan of care at this time, verbalizes understanding and denies outstanding questions.      Pap  Mammogram/MRI--done at [facility]  Self-breast exams  Consider annual health panel with Primary Care if not done  Colonoscopy as indicated  If you don't hear from the office regarding results within 1 week, please call  Follow up in 1 year for annual or sooner as  needed  Chaperone present for exam   Female chaperone present.        [1]   Social History  Tobacco Use   Smoking Status Never   Smokeless Tobacco Never

## 2025-07-07 ENCOUNTER — RESULTS FOLLOW-UP (OUTPATIENT)
Dept: OBSTETRICS AND GYNECOLOGY | Facility: CLINIC | Age: 45
End: 2025-07-07

## 2025-07-15 ENCOUNTER — PROCEDURE VISIT (OUTPATIENT)
Dept: OBSTETRICS AND GYNECOLOGY | Facility: CLINIC | Age: 45
End: 2025-07-15
Payer: COMMERCIAL

## 2025-07-15 DIAGNOSIS — Z80.41 FAMILY HISTORY OF OVARIAN CANCER: ICD-10-CM

## 2025-07-15 PROCEDURE — 76830 TRANSVAGINAL US NON-OB: CPT | Mod: 26,S$PBB,, | Performed by: OBSTETRICS & GYNECOLOGY

## 2025-07-24 DIAGNOSIS — R63.5 WEIGHT GAIN: ICD-10-CM

## 2025-07-24 DIAGNOSIS — R61 NIGHT SWEATS: ICD-10-CM

## 2025-07-24 DIAGNOSIS — Z01.419 WELL WOMAN EXAM: ICD-10-CM

## 2025-07-24 DIAGNOSIS — R23.2 HOT FLASHES: ICD-10-CM

## 2025-07-24 DIAGNOSIS — E78.00 HYPERCHOLESTEREMIA: ICD-10-CM

## 2025-07-24 RX ORDER — ESTRADIOL 0.05 MG/D
1 FILM, EXTENDED RELEASE TRANSDERMAL
Qty: 24 PATCH | Refills: 3 | Status: SHIPPED | OUTPATIENT
Start: 2025-07-24 | End: 2026-07-24